# Patient Record
Sex: FEMALE | Race: WHITE | Employment: FULL TIME | URBAN - NONMETROPOLITAN AREA
[De-identification: names, ages, dates, MRNs, and addresses within clinical notes are randomized per-mention and may not be internally consistent; named-entity substitution may affect disease eponyms.]

---

## 2017-09-23 ENCOUNTER — APPOINTMENT (OUTPATIENT)
Dept: GENERAL RADIOLOGY | Age: 47
End: 2017-09-23
Payer: COMMERCIAL

## 2017-09-23 ENCOUNTER — HOSPITAL ENCOUNTER (EMERGENCY)
Age: 47
Discharge: HOME | End: 2017-09-23
Attending: EMERGENCY MEDICINE
Payer: COMMERCIAL

## 2017-09-23 VITALS
DIASTOLIC BLOOD PRESSURE: 90 MMHG | HEIGHT: 65 IN | BODY MASS INDEX: 44.98 KG/M2 | WEIGHT: 270 LBS | TEMPERATURE: 99 F | HEART RATE: 82 BPM | OXYGEN SATURATION: 98 % | SYSTOLIC BLOOD PRESSURE: 127 MMHG | RESPIRATION RATE: 20 BRPM

## 2017-09-23 DIAGNOSIS — R07.9 CHEST PAIN, UNSPECIFIED TYPE: Primary | ICD-10-CM

## 2017-09-23 LAB
ANION GAP SERPL CALCULATED.3IONS-SCNC: 12 MEQ/L (ref 8–16)
ANISOCYTOSIS: ABNORMAL
BASOPHILS # BLD: 0.6 %
BASOPHILS ABSOLUTE: 0.1 THOU/MM3 (ref 0–0.1)
BUN BLDV-MCNC: 13 MG/DL (ref 7–22)
CALCIUM SERPL-MCNC: 9 MG/DL (ref 8.5–10.5)
CHLORIDE BLD-SCNC: 102 MEQ/L (ref 98–111)
CO2: 26 MEQ/L (ref 23–33)
CREAT SERPL-MCNC: 0.7 MG/DL (ref 0.4–1.2)
EOSINOPHIL # BLD: 2.5 %
EOSINOPHILS ABSOLUTE: 0.2 THOU/MM3 (ref 0–0.4)
GFR SERPL CREATININE-BSD FRML MDRD: > 90 ML/MIN/1.73M2
GLUCOSE BLD-MCNC: 87 MG/DL (ref 70–108)
HCT VFR BLD CALC: 36.3 % (ref 37–47)
HEMOGLOBIN: 12.4 GM/DL (ref 12–16)
LYMPHOCYTES # BLD: 29.8 %
LYMPHOCYTES ABSOLUTE: 2.5 THOU/MM3 (ref 1–4.8)
MCH RBC QN AUTO: 28.7 PG (ref 27–31)
MCHC RBC AUTO-ENTMCNC: 34.2 GM/DL (ref 33–37)
MCV RBC AUTO: 84 FL (ref 81–99)
MONOCYTES # BLD: 8.9 %
MONOCYTES ABSOLUTE: 0.8 THOU/MM3 (ref 0.4–1.3)
NUCLEATED RED BLOOD CELLS: 0 /100 WBC
OSMOLALITY CALCULATION: 278.9 MOSMOL/KG (ref 275–300)
PDW BLD-RTO: 14.7 % (ref 11.5–14.5)
PLATELET # BLD: 320 THOU/MM3 (ref 130–400)
PMV BLD AUTO: 8 MCM (ref 7.4–10.4)
POTASSIUM SERPL-SCNC: 3.8 MEQ/L (ref 3.5–5.2)
PREGNANCY, SERUM: NEGATIVE
RBC # BLD: 4.32 MILL/MM3 (ref 4.2–5.4)
RBC # BLD: NORMAL 10*6/UL
SEG NEUTROPHILS: 58.2 %
SEGMENTED NEUTROPHILS ABSOLUTE COUNT: 4.9 THOU/MM3 (ref 1.8–7.7)
SODIUM BLD-SCNC: 140 MEQ/L (ref 135–145)
TROPONIN T: < 0.01 NG/ML
TROPONIN T: < 0.01 NG/ML
WBC # BLD: 8.5 THOU/MM3 (ref 4.8–10.8)

## 2017-09-23 PROCEDURE — 84703 CHORIONIC GONADOTROPIN ASSAY: CPT

## 2017-09-23 PROCEDURE — 80048 BASIC METABOLIC PNL TOTAL CA: CPT

## 2017-09-23 PROCEDURE — 6360000002 HC RX W HCPCS: Performed by: EMERGENCY MEDICINE

## 2017-09-23 PROCEDURE — 85025 COMPLETE CBC W/AUTO DIFF WBC: CPT

## 2017-09-23 PROCEDURE — 84484 ASSAY OF TROPONIN QUANT: CPT

## 2017-09-23 PROCEDURE — 99285 EMERGENCY DEPT VISIT HI MDM: CPT

## 2017-09-23 PROCEDURE — 71020 XR CHEST STANDARD TWO VW: CPT

## 2017-09-23 PROCEDURE — 96374 THER/PROPH/DIAG INJ IV PUSH: CPT

## 2017-09-23 PROCEDURE — 93005 ELECTROCARDIOGRAM TRACING: CPT | Performed by: EMERGENCY MEDICINE

## 2017-09-23 PROCEDURE — 36415 COLL VENOUS BLD VENIPUNCTURE: CPT

## 2017-09-23 PROCEDURE — 6370000000 HC RX 637 (ALT 250 FOR IP): Performed by: EMERGENCY MEDICINE

## 2017-09-23 RX ORDER — ASPIRIN 81 MG/1
324 TABLET, CHEWABLE ORAL ONCE
Status: COMPLETED | OUTPATIENT
Start: 2017-09-23 | End: 2017-09-23

## 2017-09-23 RX ORDER — IBUPROFEN 200 MG
200 TABLET ORAL EVERY 6 HOURS PRN
COMMUNITY
End: 2019-02-10

## 2017-09-23 RX ORDER — CYCLOBENZAPRINE HCL 10 MG
10 TABLET ORAL ONCE
Status: COMPLETED | OUTPATIENT
Start: 2017-09-23 | End: 2017-09-23

## 2017-09-23 RX ORDER — ONDANSETRON 2 MG/ML
4 INJECTION INTRAMUSCULAR; INTRAVENOUS ONCE
Status: COMPLETED | OUTPATIENT
Start: 2017-09-23 | End: 2017-09-23

## 2017-09-23 RX ADMIN — ASPIRIN 81 MG 324 MG: 81 TABLET ORAL at 15:06

## 2017-09-23 RX ADMIN — CYCLOBENZAPRINE HYDROCHLORIDE 10 MG: 10 TABLET, FILM COATED ORAL at 15:58

## 2017-09-23 RX ADMIN — ONDANSETRON 4 MG: 2 INJECTION INTRAMUSCULAR; INTRAVENOUS at 15:06

## 2017-09-23 ASSESSMENT — ENCOUNTER SYMPTOMS
DIARRHEA: 0
VOMITING: 0
NAUSEA: 1
WHEEZING: 0
COUGH: 0
RHINORRHEA: 0
EYE DISCHARGE: 0
EYE PAIN: 0
BACK PAIN: 0
ABDOMINAL PAIN: 0
SHORTNESS OF BREATH: 0
SORE THROAT: 0

## 2017-09-23 ASSESSMENT — HEART SCORE: ECG: 0

## 2017-09-24 LAB
EKG ATRIAL RATE: 81 BPM
EKG P AXIS: 39 DEGREES
EKG P-R INTERVAL: 196 MS
EKG Q-T INTERVAL: 364 MS
EKG QRS DURATION: 96 MS
EKG QTC CALCULATION (BAZETT): 422 MS
EKG R AXIS: 41 DEGREES
EKG T AXIS: 43 DEGREES
EKG VENTRICULAR RATE: 81 BPM

## 2018-02-16 ENCOUNTER — HOSPITAL ENCOUNTER (EMERGENCY)
Age: 48
Discharge: HOME | End: 2018-02-16
Attending: EMERGENCY MEDICINE
Payer: COMMERCIAL

## 2018-02-16 VITALS
DIASTOLIC BLOOD PRESSURE: 88 MMHG | HEART RATE: 85 BPM | WEIGHT: 270.8 LBS | TEMPERATURE: 98.4 F | SYSTOLIC BLOOD PRESSURE: 140 MMHG | OXYGEN SATURATION: 96 % | RESPIRATION RATE: 18 BRPM | BODY MASS INDEX: 45.06 KG/M2

## 2018-02-16 DIAGNOSIS — B02.9 HERPES ZOSTER WITHOUT COMPLICATION: Primary | ICD-10-CM

## 2018-02-16 PROCEDURE — 99213 OFFICE O/P EST LOW 20 MIN: CPT

## 2018-02-16 PROCEDURE — 99213 OFFICE O/P EST LOW 20 MIN: CPT | Performed by: EMERGENCY MEDICINE

## 2018-02-16 RX ORDER — FAMCICLOVIR 500 MG/1
500 TABLET, FILM COATED ORAL 3 TIMES DAILY
Qty: 30 TABLET | Refills: 0 | Status: SHIPPED | OUTPATIENT
Start: 2018-02-16 | End: 2018-02-26

## 2018-02-16 RX ORDER — PREDNISONE 20 MG/1
20 TABLET ORAL DAILY
Qty: 14 TABLET | Refills: 0 | Status: SHIPPED | OUTPATIENT
Start: 2018-02-16 | End: 2018-02-28

## 2018-02-16 ASSESSMENT — ENCOUNTER SYMPTOMS
SHORTNESS OF BREATH: 0
ABDOMINAL PAIN: 0
CONSTIPATION: 0
STRIDOR: 0
SINUS PRESSURE: 0
NAUSEA: 0
EYE PAIN: 0
WHEEZING: 0
VOICE CHANGE: 0
BACK PAIN: 0
FACIAL SWELLING: 0
VOMITING: 0
EYE REDNESS: 0
CHOKING: 0
TROUBLE SWALLOWING: 0
COUGH: 0
EYE DISCHARGE: 0
SORE THROAT: 0
DIARRHEA: 0
BLOOD IN STOOL: 0

## 2018-02-16 ASSESSMENT — PAIN DESCRIPTION - PAIN TYPE: TYPE: ACUTE PAIN

## 2018-02-16 ASSESSMENT — PAIN DESCRIPTION - DESCRIPTORS: DESCRIPTORS: BURNING

## 2018-02-16 ASSESSMENT — PAIN SCALES - GENERAL: PAINLEVEL_OUTOF10: 2

## 2018-02-16 ASSESSMENT — PAIN DESCRIPTION - ORIENTATION: ORIENTATION: RIGHT

## 2018-02-16 ASSESSMENT — PAIN DESCRIPTION - LOCATION: LOCATION: BACK

## 2018-02-16 ASSESSMENT — PAIN DESCRIPTION - FREQUENCY: FREQUENCY: CONTINUOUS

## 2018-02-16 NOTE — ED PROVIDER NOTES
[facility] [address] [facility]  [facility]      CHIEF COMPLAINT     No chief complaint on file. Nurses Notes reviewed and I agree except as noted in the HPI. HISTORY OF PRESENT ILLNESS   Lizy Salgado is a 56 y.o. female who presents With 48 hour history of painful blisters right lateral chest wall, with patient concerned she may have shingles. She rates pain at 2 out of 10 severity. No itch, fever, vomiting, purulent discharge, headache, shortness of breath, cough, abdominal pain,  symptoms. History of varicella, no shingles vaccination. No history of diabetes or immunodeficiency. Nonsmoker    REVIEW OF SYSTEMS     Review of Systems   Constitutional: Negative for appetite change, chills, fatigue, fever and unexpected weight change. HENT: Negative for congestion, ear discharge, ear pain, facial swelling, hearing loss, nosebleeds, postnasal drip, sinus pressure, sore throat, trouble swallowing and voice change. Eyes: Negative for pain, discharge, redness and visual disturbance. Respiratory: Negative for cough, choking, shortness of breath, wheezing and stridor. Cardiovascular: Negative for chest pain and leg swelling. Gastrointestinal: Negative for abdominal pain, blood in stool, constipation, diarrhea, nausea and vomiting. Genitourinary: Negative for dysuria, flank pain, frequency, hematuria, urgency, vaginal bleeding and vaginal discharge. Musculoskeletal: Negative for arthralgias, back pain, neck pain and neck stiffness. Skin: Positive for rash. Painful rash right lateral chest wall   Neurological: Negative for dizziness, seizures, syncope, weakness, light-headedness and headaches. Hematological: Negative for adenopathy. Does not bruise/bleed easily. Psychiatric/Behavioral: Negative for confusion, sleep disturbance and suicidal ideas. The patient is not nervous/anxious. All other systems reviewed and are negative.       PAST MEDICAL HISTORY         Diagnosis complication        DISPOSITION/PLAN   DISPOSITION  Nontoxic, well-hydrated, normal airway. No airway abscess or epiglottitis, sepsis, CNS infection, pneumonia, hypoxia, bronchospasm. Patient has eruption on right lateral chest wall most consistent with uncomplicated shingles. No bacterial infection. No systemic infection. Will treat with prednisone, Famvir, Tylenol, Motrin, increased oral clear liquids, rest.  Patient to recheck with employee health regarding return to work. She is to recheck with PCP in 5 days, and  she understands to go to ED if worse.   PATIENT REFERRED TO:  Carrington Billy CNP  [address]  [phone]    Schedule an appointment as soon as possible for a visit in 5 days  recheck in office, go to emergency if worse    DISCHARGE MEDICATIONS:  Discharge Medication List as of 2/16/2018  9:25 AM      START taking these medications    Details   famciclovir (FAMVIR) 500 MG tablet Take 1 tablet by mouth 3 times daily for 10 days, Disp-30 tablet, R-0Print      predniSONE (DELTASONE) 20 MG tablet Take 1 tablet by mouth daily for 12 days 2 by mouth daily for 4 days, one by mouth daily for 4 days, one half by mouth daily for 4 days, Disp-14 tablet, R-0Print           Discharge Medication List as of 2/16/2018  9:25 AM          MD Bud Viera MD  02/16/18 [id number]

## 2018-05-25 ENCOUNTER — HOSPITAL ENCOUNTER (EMERGENCY)
Age: 48
Discharge: HOME | End: 2018-05-25
Payer: COMMERCIAL

## 2018-05-25 VITALS
RESPIRATION RATE: 18 BRPM | BODY MASS INDEX: 44.93 KG/M2 | OXYGEN SATURATION: 99 % | DIASTOLIC BLOOD PRESSURE: 78 MMHG | HEART RATE: 77 BPM | TEMPERATURE: 98.1 F | SYSTOLIC BLOOD PRESSURE: 148 MMHG | WEIGHT: 270 LBS

## 2018-05-25 DIAGNOSIS — L23.7 POISON IVY DERMATITIS: Primary | ICD-10-CM

## 2018-05-25 PROCEDURE — 99212 OFFICE O/P EST SF 10 MIN: CPT | Performed by: NURSE PRACTITIONER

## 2018-05-25 PROCEDURE — 6360000002 HC RX W HCPCS: Performed by: NURSE PRACTITIONER

## 2018-05-25 PROCEDURE — 96372 THER/PROPH/DIAG INJ SC/IM: CPT

## 2018-05-25 PROCEDURE — 99212 OFFICE O/P EST SF 10 MIN: CPT

## 2018-05-25 RX ORDER — PREDNISONE 20 MG/1
40 TABLET ORAL DAILY
Qty: 6 TABLET | Refills: 0 | Status: SHIPPED | OUTPATIENT
Start: 2018-05-25 | End: 2018-05-28

## 2018-05-25 RX ORDER — METHYLPREDNISOLONE SODIUM SUCCINATE 125 MG/2ML
80 INJECTION, POWDER, LYOPHILIZED, FOR SOLUTION INTRAMUSCULAR; INTRAVENOUS ONCE
Status: COMPLETED | OUTPATIENT
Start: 2018-05-25 | End: 2018-05-25

## 2018-05-25 RX ADMIN — METHYLPREDNISOLONE SODIUM SUCCINATE 80 MG: 125 INJECTION, POWDER, FOR SOLUTION INTRAMUSCULAR; INTRAVENOUS at 12:58

## 2018-05-25 ASSESSMENT — ENCOUNTER SYMPTOMS
SINUS PRESSURE: 0
COUGH: 0
SHORTNESS OF BREATH: 0
CHEST TIGHTNESS: 0

## 2018-05-25 ASSESSMENT — PAIN DESCRIPTION - DESCRIPTORS: DESCRIPTORS: BURNING;DISCOMFORT

## 2018-05-25 ASSESSMENT — PAIN SCALES - GENERAL: PAINLEVEL_OUTOF10: 3

## 2019-02-10 ENCOUNTER — HOSPITAL ENCOUNTER (EMERGENCY)
Age: 49
Discharge: HOME | End: 2019-02-10
Attending: FAMILY MEDICINE
Payer: COMMERCIAL

## 2019-02-10 VITALS
BODY MASS INDEX: 44.98 KG/M2 | DIASTOLIC BLOOD PRESSURE: 89 MMHG | HEIGHT: 65 IN | WEIGHT: 270 LBS | TEMPERATURE: 98.5 F | HEART RATE: 104 BPM | RESPIRATION RATE: 19 BRPM | SYSTOLIC BLOOD PRESSURE: 177 MMHG | OXYGEN SATURATION: 96 %

## 2019-02-10 DIAGNOSIS — J02.9 PHARYNGITIS, UNSPECIFIED ETIOLOGY: ICD-10-CM

## 2019-02-10 DIAGNOSIS — J02.9 SORE THROAT: Primary | ICD-10-CM

## 2019-02-10 LAB
ANION GAP SERPL CALCULATED.3IONS-SCNC: 14 MEQ/L (ref 8–16)
BASOPHILS # BLD: 0.6 %
BASOPHILS ABSOLUTE: 0.1 THOU/MM3 (ref 0–0.1)
BUN BLDV-MCNC: 12 MG/DL (ref 7–22)
CALCIUM SERPL-MCNC: 8.9 MG/DL (ref 8.5–10.5)
CHLORIDE BLD-SCNC: 104 MEQ/L (ref 98–111)
CO2: 24 MEQ/L (ref 23–33)
CREAT SERPL-MCNC: 0.7 MG/DL (ref 0.4–1.2)
EOSINOPHIL # BLD: 0.4 %
EOSINOPHILS ABSOLUTE: 0 THOU/MM3 (ref 0–0.4)
ERYTHROCYTE [DISTWIDTH] IN BLOOD BY AUTOMATED COUNT: 14 % (ref 11.5–14.5)
ERYTHROCYTE [DISTWIDTH] IN BLOOD BY AUTOMATED COUNT: 43.5 FL (ref 35–45)
FLU A ANTIGEN: NEGATIVE
FLU B ANTIGEN: NEGATIVE
GFR SERPL CREATININE-BSD FRML MDRD: 90 ML/MIN/1.73M2
GLUCOSE BLD-MCNC: 122 MG/DL (ref 70–108)
GROUP A STREP CULTURE, REFLEX: NEGATIVE
HCT VFR BLD CALC: 37.9 % (ref 37–47)
HEMOGLOBIN: 12.5 GM/DL (ref 12–16)
HETEROPHILE ANTIBODIES: NEGATIVE
IMMATURE GRANS (ABS): 0.03 THOU/MM3 (ref 0–0.07)
IMMATURE GRANULOCYTES: 0.4 %
LYMPHOCYTES # BLD: 13.5 %
LYMPHOCYTES ABSOLUTE: 1.1 THOU/MM3 (ref 1–4.8)
MCH RBC QN AUTO: 28 PG (ref 26–33)
MCHC RBC AUTO-ENTMCNC: 33 GM/DL (ref 32.2–35.5)
MCV RBC AUTO: 85 FL (ref 81–99)
MONOCYTES # BLD: 13.3 %
MONOCYTES ABSOLUTE: 1.1 THOU/MM3 (ref 0.4–1.3)
NUCLEATED RED BLOOD CELLS: 0 /100 WBC
OSMOLALITY CALCULATION: 284.2 MOSMOL/KG (ref 275–300)
PLATELET # BLD: 299 THOU/MM3 (ref 130–400)
PMV BLD AUTO: 9.6 FL (ref 9.4–12.4)
POTASSIUM SERPL-SCNC: 4.4 MEQ/L (ref 3.5–5.2)
RBC # BLD: 4.46 MILL/MM3 (ref 4.2–5.4)
REFLEX THROAT C + S: NORMAL
SEG NEUTROPHILS: 71.8 %
SEGMENTED NEUTROPHILS ABSOLUTE COUNT: 6.1 THOU/MM3 (ref 1.8–7.7)
SODIUM BLD-SCNC: 142 MEQ/L (ref 135–145)
WBC # BLD: 8.5 THOU/MM3 (ref 4.8–10.8)

## 2019-02-10 PROCEDURE — 86308 HETEROPHILE ANTIBODY SCREEN: CPT

## 2019-02-10 PROCEDURE — 85025 COMPLETE CBC W/AUTO DIFF WBC: CPT

## 2019-02-10 PROCEDURE — 99283 EMERGENCY DEPT VISIT LOW MDM: CPT

## 2019-02-10 PROCEDURE — 36415 COLL VENOUS BLD VENIPUNCTURE: CPT

## 2019-02-10 PROCEDURE — 87070 CULTURE OTHR SPECIMN AEROBIC: CPT

## 2019-02-10 PROCEDURE — 80048 BASIC METABOLIC PNL TOTAL CA: CPT

## 2019-02-10 PROCEDURE — 6370000000 HC RX 637 (ALT 250 FOR IP): Performed by: FAMILY MEDICINE

## 2019-02-10 PROCEDURE — 87804 INFLUENZA ASSAY W/OPTIC: CPT

## 2019-02-10 PROCEDURE — 87880 STREP A ASSAY W/OPTIC: CPT

## 2019-02-10 RX ORDER — IBUPROFEN 200 MG
400 TABLET ORAL ONCE
Status: COMPLETED | OUTPATIENT
Start: 2019-02-10 | End: 2019-02-10

## 2019-02-10 RX ADMIN — IBUPROFEN 400 MG: 200 TABLET, FILM COATED ORAL at 06:08

## 2019-02-10 RX ADMIN — LIDOCAINE HYDROCHLORIDE 15 ML: 20 SOLUTION ORAL; TOPICAL at 06:08

## 2019-02-10 ASSESSMENT — PAIN DESCRIPTION - DESCRIPTORS: DESCRIPTORS: BURNING

## 2019-02-10 ASSESSMENT — PAIN DESCRIPTION - LOCATION: LOCATION: THROAT;EAR;HEAD

## 2019-02-10 ASSESSMENT — PAIN DESCRIPTION - FREQUENCY: FREQUENCY: CONTINUOUS

## 2019-02-10 ASSESSMENT — PAIN DESCRIPTION - PAIN TYPE: TYPE: ACUTE PAIN

## 2019-02-10 ASSESSMENT — PAIN SCALES - GENERAL: PAINLEVEL_OUTOF10: 3

## 2019-02-12 ENCOUNTER — OFFICE VISIT (OUTPATIENT)
Dept: INTERNAL MEDICINE CLINIC | Age: 49
End: 2019-02-12
Payer: COMMERCIAL

## 2019-02-12 VITALS
WEIGHT: 263.5 LBS | SYSTOLIC BLOOD PRESSURE: 148 MMHG | DIASTOLIC BLOOD PRESSURE: 72 MMHG | HEART RATE: 95 BPM | BODY MASS INDEX: 43.9 KG/M2 | TEMPERATURE: 99.1 F | HEIGHT: 65 IN | RESPIRATION RATE: 16 BRPM

## 2019-02-12 DIAGNOSIS — R73.09 ELEVATED GLUCOSE LEVEL: ICD-10-CM

## 2019-02-12 DIAGNOSIS — Z13.220 SCREENING CHOLESTEROL LEVEL: ICD-10-CM

## 2019-02-12 DIAGNOSIS — J01.10 ACUTE NON-RECURRENT FRONTAL SINUSITIS: ICD-10-CM

## 2019-02-12 DIAGNOSIS — H65.00 ACUTE SEROUS OTITIS MEDIA, RECURRENCE NOT SPECIFIED, UNSPECIFIED LATERALITY: ICD-10-CM

## 2019-02-12 DIAGNOSIS — B96.89 ACUTE BACTERIAL TONSILLITIS: Primary | ICD-10-CM

## 2019-02-12 DIAGNOSIS — J03.80 ACUTE BACTERIAL TONSILLITIS: Primary | ICD-10-CM

## 2019-02-12 PROCEDURE — 99204 OFFICE O/P NEW MOD 45 MIN: CPT | Performed by: NURSE PRACTITIONER

## 2019-02-12 RX ORDER — AMOXICILLIN AND CLAVULANATE POTASSIUM 875; 125 MG/1; MG/1
1 TABLET, FILM COATED ORAL 2 TIMES DAILY
Qty: 20 TABLET | Refills: 0 | Status: SHIPPED | OUTPATIENT
Start: 2019-02-12 | End: 2019-02-22

## 2019-02-12 RX ORDER — FLUTICASONE PROPIONATE 50 MCG
2 SPRAY, SUSPENSION (ML) NASAL DAILY
Qty: 1 BOTTLE | Refills: 0 | Status: SHIPPED | OUTPATIENT
Start: 2019-02-12

## 2019-02-12 ASSESSMENT — ENCOUNTER SYMPTOMS
COUGH: 0
DIARRHEA: 0
BACK PAIN: 0
STRIDOR: 0
ABDOMINAL PAIN: 0
WHEEZING: 0
TROUBLE SWALLOWING: 1
ABDOMINAL DISTENTION: 0
NAUSEA: 0
SHORTNESS OF BREATH: 0
RHINORRHEA: 1
VOMITING: 0
CHEST TIGHTNESS: 0
PHOTOPHOBIA: 0
SORE THROAT: 1
CONSTIPATION: 0

## 2019-02-12 ASSESSMENT — PATIENT HEALTH QUESTIONNAIRE - PHQ9
2. FEELING DOWN, DEPRESSED OR HOPELESS: 1
SUM OF ALL RESPONSES TO PHQ9 QUESTIONS 1 & 2: 1
SUM OF ALL RESPONSES TO PHQ QUESTIONS 1-9: 1
1. LITTLE INTEREST OR PLEASURE IN DOING THINGS: 0
SUM OF ALL RESPONSES TO PHQ QUESTIONS 1-9: 1

## 2019-02-13 LAB — THROAT/NOSE CULTURE: NORMAL

## 2019-03-08 ENCOUNTER — EMPLOYEE WELLNESS (OUTPATIENT)
Dept: OTHER | Age: 49
End: 2019-03-08

## 2019-03-08 LAB
CHOLESTEROL, TOTAL: 117 MG/DL (ref 0–199)
FASTING: YES
GLUCOSE BLD-MCNC: 100 MG/DL (ref 74–109)
HDLC SERPL-MCNC: 70 MG/DL (ref 40–90)
LDL CHOLESTEROL CALCULATED: 40 MG/DL
TRIGL SERPL-MCNC: 37 MG/DL (ref 0–199)

## 2019-03-12 ENCOUNTER — OFFICE VISIT (OUTPATIENT)
Dept: INTERNAL MEDICINE CLINIC | Age: 49
End: 2019-03-12
Payer: COMMERCIAL

## 2019-03-12 VITALS
SYSTOLIC BLOOD PRESSURE: 116 MMHG | BODY MASS INDEX: 44.4 KG/M2 | WEIGHT: 266.5 LBS | HEIGHT: 65 IN | RESPIRATION RATE: 14 BRPM | DIASTOLIC BLOOD PRESSURE: 62 MMHG | HEART RATE: 73 BPM

## 2019-03-12 DIAGNOSIS — R07.9 CHEST PAIN, UNSPECIFIED TYPE: ICD-10-CM

## 2019-03-12 DIAGNOSIS — R73.09 ELEVATED GLUCOSE LEVEL: ICD-10-CM

## 2019-03-12 DIAGNOSIS — R53.83 FATIGUE, UNSPECIFIED TYPE: Primary | ICD-10-CM

## 2019-03-12 DIAGNOSIS — K21.9 GASTROESOPHAGEAL REFLUX DISEASE, ESOPHAGITIS PRESENCE NOT SPECIFIED: ICD-10-CM

## 2019-03-12 DIAGNOSIS — F41.9 ANXIETY: ICD-10-CM

## 2019-03-12 LAB — HBA1C MFR BLD: 5.4 % (ref 4.3–5.7)

## 2019-03-12 PROCEDURE — 83036 HEMOGLOBIN GLYCOSYLATED A1C: CPT | Performed by: NURSE PRACTITIONER

## 2019-03-12 PROCEDURE — 93000 ELECTROCARDIOGRAM COMPLETE: CPT | Performed by: NURSE PRACTITIONER

## 2019-03-12 PROCEDURE — 99214 OFFICE O/P EST MOD 30 MIN: CPT | Performed by: NURSE PRACTITIONER

## 2019-03-12 RX ORDER — IBUPROFEN 200 MG
200 TABLET ORAL EVERY 6 HOURS PRN
COMMUNITY

## 2019-03-12 RX ORDER — OMEPRAZOLE 20 MG/1
20 CAPSULE, DELAYED RELEASE ORAL
Qty: 30 CAPSULE | Refills: 0 | Status: SHIPPED | OUTPATIENT
Start: 2019-03-12 | End: 2019-05-13 | Stop reason: ALTCHOICE

## 2019-03-12 RX ORDER — LORATADINE 10 MG/1
10 CAPSULE, LIQUID FILLED ORAL DAILY PRN
COMMUNITY

## 2019-03-12 NOTE — PROGRESS NOTES
[facility] PROFESSIONAL SERVS  PHYSICIANS [facility] [address]  [address] [facility], [address]  Dept: [phone]  Dept Fax: [phone]      Evy Hood  53 y.o.  [id number]  4/7/19    Chief Complaint   Patient presents with    Other     elevated glucose    Results     labs completed 3/8/19         Current concerns - Evy Hood presents with fatigue and lab review. A1C today 5.4, checked due to elevated glucose level 2/10/19 of 122. Last lipids from 3/8/19 show total chol 117, trig 37 (goal <150), HDL 70 (>40 men, >50 women), LDL 40 (goal <70). Reviewed with pt and discussed. Fatigue - Continues, pt states she sleeps at 5 straight hours, goes to bed early at 6:30-7 pm, states she does not sleep well after 5 hours. Cannot stay asleep. No racing thoughts. States she feels her mood is improving overall. Periods are heavy and painful, sees GYN, had a biopsy and they suggested BCP. No hirsutism. States some fullness in her throat, dry itchy eyes. Denies brittle hair/nails. Chest pain - Has chest heaviness, panicky feeling, improving, last episode 12 weeks ago. Had full cardiac workup in the past for this. Has burning in her esophagus at times sometimes nausea, related to eating or smell of food. Takes griselda tea. No PPI/HR2A    STOP-Bang questionnaire    1. Do you Snore loudly (loud enough to be heard through closed doors or your bed partner elbows you for snoring at night)? Yes    2. Do you often feel Tired, Fatigued, or Sleepy during the daytime (such as falling asleep during driving)? Yes    3. Has anyone Observed you Stop Breathing or Choking/Gasping during your sleep? No    4. Do you have or are being treated for High Blood Pressure? No    5. Body Mass Index more than 35kg/m2? Yes    6. Age older than 48years old? No    7. Neck size large? (measured around Osborne apple) Yes   For males, is your shirt collar 17 inches or larger?    For females, is your shirt collar 16 inches or larger? 8. Gender= Male?  No      Total Score: 4    Scoring Criteria:    For general population:   Low Risk of JOAQUIN: Yes to 0 to 2 questions   Intermediate Risk of JOAQUIN: Yes to 3 to 4 questions   High Risk of JOAQUIN: Yes to 5 to 8 questions      Past Medical History:   Diagnosis Date    Gall stones         Past Surgical History:   Procedure Laterality Date    NASAL FRACTURE SURGERY      TUBAL LIGATION          Family History   Problem Relation Age of Onset    Heart Disease Mother     Diabetes Maternal Grandfather     Heart Disease Paternal Grandfather     Dementia Paternal Grandfather     Diabetes Paternal Grandfather     Diabetes Maternal Grandmother         Social History     Socioeconomic History    Marital status:      Spouse name: Not on file    Number of children: Not on file    Years of education: Not on file    Highest education level: Not on file   Occupational History    Not on file   Social Needs    Financial resource strain: Not on file    Food insecurity:     Worry: Not on file     Inability: Not on file    Transportation needs:     Medical: Not on file     Non-medical: Not on file   Tobacco Use    Smoking status: Never Smoker    Smokeless tobacco: Never Used   Substance and Sexual Activity    Alcohol use: No    Drug use: No    Sexual activity: Yes     Partners: Male   Lifestyle    Physical activity:     Days per week: Not on file     Minutes per session: Not on file    Stress: Not on file   Relationships    Social connections:     Talks on phone: Not on file     Gets together: Not on file     Attends [employer] service: Not on file     Active member of club or organization: Not on file     Attends meetings of clubs or organizations: Not on file     Relationship status: Not on file    Intimate partner violence:     Fear of current or ex partner: Not on file     Emotionally abused: Not on file     Physically abused: Not on file     Forced sexual activity: Not on file   Other Topics Concern    Not on file   Social History Narrative    Not on file       Prior to Admission medications    Medication Sig Start Date End Date Taking? Authorizing Provider   loratadine (CLARITIN) 10 MG capsule Take 10 mg by mouth daily as needed   Yes Historical Provider, MD   ibuprofen (ADVIL;MOTRIN) 200 MG tablet Take 200 mg by mouth every 6 hours as needed for Pain   Yes Historical Provider, MD   omeprazole (PRILOSEC) 20 MG delayed release capsule Take 1 capsule by mouth every morning (before breakfast) 3/12/19  Yes EMI Courtney CNP   fluticasone (FLONASE) 50 MCG/ACT nasal spray 2 sprays by Each Nare route daily 2/12/19  Yes EMI Courtney CNP   sertraline (ZOLOFT) 50 MG tablet Take 1 tablet by mouth daily 3/28/19   EMI Courtney CNP        No Known Allergies    Review of Systems - General ROS: negative for - chills, fever or malaise. Positive for fatigue. Started multi vitamin. Psychological ROS: positive for - depression, states improving. Hematological and Lymphatic ROS: No history of blood clots or bleeding disorder. Respiratory ROS: no cough, shortness of breath, or wheezing  Cardiovascular ROS: no chest pain or dyspnea on exertion  Gastrointestinal ROS: no abdominal pain, change in bowel habits, or black or bloody stools  Genito-Urinary ROS: No dysuria, hematuria, burning or pain with urination. Musculoskeletal ROS: No joint pain, joint swelling, muscle aches or muscle weakness. Neurological ROS: No dizziness, headaches, unsteady gait, balance instability. Dermatological ROS: No rashes or wounds. Blood pressure 116/62, pulse 73, resp. rate 14, height 5' 5\" (1.651 m), weight 266 lb 8 oz (120.9 kg). Physical Examination:   Constitutional - Alert, cooperative, well groomed, and in no distress. Vital signs stable   Vitals:    03/12/19 1042   BP: 116/62   Pulse: 73   Resp:      Mental status - Alert and oriented to person, place, and time.  Good insight, appropriate responses, mood appropriate. Head - Atraumatic, normocephalic. Eyes - Pupils equal and reactive to light, extraocular movements intact  Ears - External ears are normal, hearing is grossly normal to speech  Mouth - Oropharynx clear, mucous membranes pink and moist, dentition intact. Neck - supple, no significant adenopathy, no JVD. Chest - No distress. No increased work of breathing. Clear to auscultation in all fields, no wheezes, rales or rhonchi, symmetric air entry. Heart - normal rate, regular rhythm, normal S1, S2, no murmurs, rubs  or gallops  Abdomen -soft, nontender, nondistended  Neurological - alert, oriented, grossly intact  Extremities - peripheral pulses normal, no pedal edema, no clubbing or cyanosis  Skin - warm and dry, no rashes, lesions, or wounds evident on exposed skin    Diagnostic Data:  I have reviewed recent diagnostic testing including labs, EKG, radiology results. Please see EKG for interpretation, sinus rhythm, no ST/T wave changes    Lab Results   Component Value Date     02/10/2019    K 4.4 02/10/2019     02/10/2019    CO2 24 02/10/2019    BUN 12 02/10/2019    CREATININE 0.7 02/10/2019    GLUCOSE 100 03/08/2019    CALCIUM 8.9 02/10/2019      Lab Results   Component Value Date    LABA1C 5.4 03/12/2019     No results found for: EAG  Lab Results   Component Value Date    CHOL 117 03/08/2019    CHOL 105 03/06/2015     Lab Results   Component Value Date    TRIG 37 03/08/2019    TRIG 27 03/06/2015     Lab Results   Component Value Date    HDL 70 03/08/2019    HDL 66 03/06/2015     Lab Results   Component Value Date    LDLCALC 40 03/08/2019    LDLCALC 34 03/06/2015     No results found for: LABVLDL, VLDL  No results found for: CHOLHDLRATIO  Lab Results   Component Value Date    WBC 8.5 02/10/2019    HGB 12.5 02/10/2019    HCT 37.9 02/10/2019    MCV 85.0 02/10/2019     02/10/2019         Assessment/Plan:   Diagnosis Orders   1.  Fatigue,

## 2019-03-12 NOTE — PATIENT INSTRUCTIONS
Would prefer to pursue weight loss and exercise to improve mood, energy and snoring. Encouraged, education given. Start Omeprazole 20 mg daily    Patient Education        Learning About Cutting Calories  How do calories affect your weight? Food gives your body energy. Energy from the food you eat is measured in calories. This energy keeps your heart beating, your brain active, and your muscles working. Your body needs a certain number of calories each day. After your body uses the calories it needs, it stores extra calories as fat. To lose weight safely, you have to eat fewer calories while eating in a healthy way. How many calories do you need each day? The more active you are, the more calories you need. When you are less active, you need fewer calories. How many calories you need each day also depends on several things, including your age and whether you are male or female. Here are some general guidelines for adults:  · Less active women and older adults need 1,600 to 2,000 calories each day. · Active women and less active men need 2,000 to 2,400 calories each day. · Active men need 2,400 to 3,000 calories each day. How can you cut calories and eat healthy meals? Whole grains, vegetables and fruits, and dried beans are good lower-calorie foods. They give you lots of nutrients and fiber. And they fill you up. Sweets, energy drinks, and soda pop are high in calories. They give you few nutrients and no fiber. Try to limit soda pop, fruit juice, and energy drinks. Drink water instead. Some fats can be part of a healthy diet. But cutting back on fats from highly processed foods like fast foods and many snack foods is a good way to lower the calories in your diet. Also, use smaller amounts of fats like butter, margarine, salad dressing, and mayonnaise. Add fresh garlic, lemon, or herbs to your meals to add flavor without adding fat. Meats and dairy products can be a big source of hidden fats.  Try to choose lean or low-fat versions of these products. Fat-free cookies, candies, chips, and frozen treats can still be high in sugar and calories. Some fat-free foods have more calories than regular ones. Eat fat-free treats in moderation, as you would other foods. If your favorite foods are high in fat, salt, sugar, or calories, limit how often you eat them. Eat smaller servings, or look for healthy substitutes. Fill up on fruits, vegetables, and whole grains. Eating at home  · Use meat as a side dish instead of as the main part of your meal.  · Try main dishes that use whole wheat pasta, brown rice, dried beans, or vegetables. · Find ways to cook with little or no fat, such as broiling, steaming, or grilling. · Use cooking spray instead of oil. If you use oil, use a monounsaturated oil, such as canola or olive oil. · Trim fat from meats before you cook them. · Drain off fat after you brown the meat or while you roast it. · Chill soups and stews after you cook them. Then skim the fat off the top after it hardens. Eating out  · Order foods that are broiled or poached rather than fried or breaded. · Cut back on the amount of butter or margarine that you use on bread. · Order sauces, gravies, and salad dressings on the side, and use only a little. · When you order pasta, choose tomato sauce rather than cream sauce. · Ask for salsa with your baked potato instead of sour cream, butter, cheese, or celaya. · Order meals in a small size instead of upgrading to a large. · Share an entree, or take part of your food home to eat as another meal.  · Share appetizers and desserts. Where can you learn more? Go to https://SpotMe FitnessvadimGravy.Mine. org and sign in to your [employer] account. Enter Q495 in the [employer] box to learn more about \"Learning About Cutting Calories. \"     If you do not have an account, please click on the \"Sign Up Now\" link.   Current as of: March 28, 2018  Content Version: 11.9  © 5627-5078 Healthwise, Incorporated. Care instructions adapted under license by [facility] ([facility]). If you have questions about a medical condition or this instruction, always ask your healthcare professional. [address] any warranty or liability for your use of this information. Patient Education        Starting a Weight Loss Plan: Care Instructions  Your Care Instructions    If you are thinking about losing weight, it can be hard to know where to start. Your doctor can help you set up a weight loss plan that best meets your needs. You may want to take a class on nutrition or exercise, or join a weight loss support group. If you have questions about how to make changes to your eating or exercise habits, ask your doctor about seeing a registered dietitian or an exercise specialist.  It can be a big challenge to lose weight. But you do not have to make huge changes at once. Make small changes, and stick with them. When those changes become habit, add a few more changes. If you do not think you are ready to make changes right now, try to pick a date in the future. Make an appointment to see your doctor to discuss whether the time is right for you to start a plan. Follow-up care is a key part of your treatment and safety. Be sure to make and go to all appointments, and call your doctor if you are having problems. It's also a good idea to know your test results and keep a list of the medicines you take. How can you care for yourself at home? · Set realistic goals. Many people expect to lose much more weight than is likely. A weight loss of 5% to 10% of your body weight may be enough to improve your health. · Get family and friends involved to provide support. Talk to them about why you are trying to lose weight, and ask them to help. They can help by participating in exercise and having meals with you, even if they may be eating something different. · Find what works best for you. If you do not have time or do not like to cook, a program that offers meal replacement bars or shakes may be better for you. Or if you like to prepare meals, finding a plan that includes daily menus and recipes may be best.  · Ask your doctor about other health professionals who can help you achieve your weight loss goals. ? A dietitian can help you make healthy changes in your diet. ? An exercise specialist or  can help you develop a safe and effective exercise program.  ? A counselor or psychiatrist can help you cope with issues such as depression, anxiety, or family problems that can make it hard to focus on weight loss. · Consider joining a support group for people who are trying to lose weight. Your doctor can suggest groups in your area. Where can you learn more? Go to https://Viddyadceceeb.wireWAX. org and sign in to your [employer] account. Enter Q478 in the Mu[employer] box to learn more about \"Starting a Weight Loss Plan: Care Instructions. \"     If you do not have an account, please click on the \"Sign Up Now\" link. Current as of: June 25, 2018  Content Version: 11.9  © 1726-0285 [employer]. Care instructions adapted under license by [facility] ([facility]). If you have questions about a medical condition or this instruction, always ask your healthcare professional. [address] any warranty or liability for your use of this information.

## 2019-03-16 ENCOUNTER — HOSPITAL ENCOUNTER (OUTPATIENT)
Age: 49
Discharge: HOME | End: 2019-03-16
Payer: COMMERCIAL

## 2019-03-16 DIAGNOSIS — R53.83 FATIGUE, UNSPECIFIED TYPE: ICD-10-CM

## 2019-03-16 LAB — TSH SERPL DL<=0.05 MIU/L-ACNC: 1.42 UIU/ML (ref 0.4–4.2)

## 2019-03-16 PROCEDURE — 36415 COLL VENOUS BLD VENIPUNCTURE: CPT

## 2019-03-16 PROCEDURE — 84443 ASSAY THYROID STIM HORMONE: CPT

## 2019-03-28 ENCOUNTER — OFFICE VISIT (OUTPATIENT)
Dept: INTERNAL MEDICINE CLINIC | Age: 49
End: 2019-03-28
Payer: COMMERCIAL

## 2019-03-28 VITALS
HEART RATE: 68 BPM | DIASTOLIC BLOOD PRESSURE: 68 MMHG | HEIGHT: 65 IN | WEIGHT: 261 LBS | SYSTOLIC BLOOD PRESSURE: 132 MMHG | BODY MASS INDEX: 43.49 KG/M2

## 2019-03-28 DIAGNOSIS — R53.83 FATIGUE, UNSPECIFIED TYPE: ICD-10-CM

## 2019-03-28 DIAGNOSIS — F32.1 CURRENT MODERATE EPISODE OF MAJOR DEPRESSIVE DISORDER, UNSPECIFIED WHETHER RECURRENT (HCC): Primary | ICD-10-CM

## 2019-03-28 PROCEDURE — G0444 DEPRESSION SCREEN ANNUAL: HCPCS | Performed by: NURSE PRACTITIONER

## 2019-03-28 PROCEDURE — 99214 OFFICE O/P EST MOD 30 MIN: CPT | Performed by: NURSE PRACTITIONER

## 2019-03-28 ASSESSMENT — PATIENT HEALTH QUESTIONNAIRE - PHQ9
4. FEELING TIRED OR HAVING LITTLE ENERGY: 3
7. TROUBLE CONCENTRATING ON THINGS, SUCH AS READING THE NEWSPAPER OR WATCHING TELEVISION: 2
2. FEELING DOWN, DEPRESSED OR HOPELESS: 3
5. POOR APPETITE OR OVEREATING: 2
SUM OF ALL RESPONSES TO PHQ QUESTIONS 1-9: 16
SUM OF ALL RESPONSES TO PHQ QUESTIONS 1-9: 16
8. MOVING OR SPEAKING SO SLOWLY THAT OTHER PEOPLE COULD HAVE NOTICED. OR THE OPPOSITE, BEING SO FIGETY OR RESTLESS THAT YOU HAVE BEEN MOVING AROUND A LOT MORE THAN USUAL: 1
9. THOUGHTS THAT YOU WOULD BE BETTER OFF DEAD, OR OF HURTING YOURSELF: 0
3. TROUBLE FALLING OR STAYING ASLEEP: 3
10. IF YOU CHECKED OFF ANY PROBLEMS, HOW DIFFICULT HAVE THESE PROBLEMS MADE IT FOR YOU TO DO YOUR WORK, TAKE CARE OF THINGS AT HOME, OR GET ALONG WITH OTHER PEOPLE: 2
SUM OF ALL RESPONSES TO PHQ9 QUESTIONS 1 & 2: 5
6. FEELING BAD ABOUT YOURSELF - OR THAT YOU ARE A FAILURE OR HAVE LET YOURSELF OR YOUR FAMILY DOWN: 0
1. LITTLE INTEREST OR PLEASURE IN DOING THINGS: 2

## 2019-03-28 NOTE — PROGRESS NOTES
[facility] PROFESSIONAL SERVS  PHYSICIANS [facility] [address], [address]  Dept: [phone]  Dept Fax: [phone]      Evy Hood  53 y.o.  [id number]  3/28/19    Chief Complaint   Patient presents with    Fatigue    Discuss Labs       Current concerns - Evy Hood presents for fatigue and lab review. She states her  has a drinking problem and a lot of her stress comes from that. He is not nice to her when he drinks. She states she has had a rough week. She wishes to see a counselor. She has been more irritable and noises are bothering her. PHQ 9 16 - moderately severe depression. TSH normal.     She has been working on her diet and exercise. Weight is down 5# since last visit. She does stress eat. She does snore which has worsened as she has gained weight. Encouraged on her weight loss! Mother had a severe psychiatric illness, \"chemical imbalance\", heard voices. As did another niece, aunt, multiple relatives. Pt denies any suicidal or homidical ideation.        Past Medical History:   Diagnosis Date    Gall stones         Past Surgical History:   Procedure Laterality Date    NASAL FRACTURE SURGERY      TUBAL LIGATION          Family History   Problem Relation Age of Onset    Heart Disease Mother     Diabetes Maternal Grandfather     Heart Disease Paternal Grandfather     Dementia Paternal Grandfather     Diabetes Paternal Grandfather     Diabetes Maternal Grandmother         Social History     Socioeconomic History    Marital status:      Spouse name: Not on file    Number of children: Not on file    Years of education: Not on file    Highest education level: Not on file   Occupational History    Not on file   Social Needs    Financial resource strain: Not on file    Food insecurity:     Worry: Not on file     Inability: Not on file    Transportation needs:     Medical: Not on file     Non-medical: Not on file   Tobacco Use    Smoking status: Never Smoker    Smokeless tobacco: Never Used   Substance and Sexual Activity    Alcohol use: No    Drug use: No    Sexual activity: Yes     Partners: Male   Lifestyle    Physical activity:     Days per week: Not on file     Minutes per session: Not on file    Stress: Not on file   Relationships    Social connections:     Talks on phone: Not on file     Gets together: Not on file     Attends Mor[employer] service: Not on file     Active member of club or organization: Not on file     Attends meetings of clubs or organizations: Not on file     Relationship status: Not on file    Intimate partner violence:     Fear of current or ex partner: Not on file     Emotionally abused: Not on file     Physically abused: Not on file     Forced sexual activity: Not on file   Other Topics Concern    Not on file   Social History Narrative    Not on file       Prior to Admission medications    Medication Sig Start Date End Date Taking? Authorizing Provider   loratadine (CLARITIN) 10 MG capsule Take 10 mg by mouth daily as needed   Yes Historical Provider, MD   ibuprofen (ADVIL;MOTRIN) 200 MG tablet Take 200 mg by mouth every 6 hours as needed for Pain   Yes Historical Provider, MD   omeprazole (PRILOSEC) 20 MG delayed release capsule Take 1 capsule by mouth every morning (before breakfast) 3/12/19  Yes EMI Fox CNP   fluticasone (FLONASE) 50 MCG/ACT nasal spray 2 sprays by Each Nare route daily 2/12/19  Yes EMI Fox CNP        No Known Allergies    Review of Systems - General ROS: positive for  - fatigue  negative for - chills, fever or malaise  Psychological ROS: positive for - irritability, anxiety and depression  negative for - physical abuse, sexual abuse or suicidal ideation  Hematological and Lymphatic ROS: No history of blood clots or bleeding disorder.    Respiratory ROS: no cough, shortness of breath, or wheezing  Cardiovascular ROS: no chest pain or dyspnea on exertion  Gastrointestinal ROS: no abdominal pain, change in bowel habits, or black or bloody stools  Genito-Urinary ROS: no dysuria, trouble voiding, or hematuria  Musculoskeletal ROS: negative for - joint pain, joint swelling, muscle pain or weakness  Neurological ROS: negative for - confusion, dizziness, headaches, impaired coordination/balance, numbness/tingling or weakness  Dermatological ROS: negative for - rash or wounds    Blood pressure 132/68, pulse 68, height 5' 5\" (1.651 m), weight 261 lb (118.4 kg). Physical Examination:   Constitutional - Alert, cooperative, well groomed, and in no distress. Vital signs stable   Vitals:    03/28/19 1540   BP: 132/68   Pulse: 68     Mental status - Alert and oriented to person, place, and time. Good insight, appropriate responses, mood appropriate. Head - Atraumatic, normocephalic. Eyes - Pupils equal and reactive to light, extraocular movements intact  Ears - External ears are normal, hearing is grossly normal to speech  Mouth - Oropharynx clear, mucous membranes pink and moist, dentition intact. Neck - supple, no significant adenopathy, no JVD. Chest - No distress. No increased work of breathing. Clear to auscultation in all fields, no wheezes, rales or rhonchi, symmetric air entry.    Heart - normal rate, regular rhythm, normal S1, S2, no murmurs, rubs  or gallops  Abdomen -soft, nontender, nondistended  Neurological - alert, oriented, grossly intact  Extremities - peripheral pulses normal, no pedal edema, no clubbing or cyanosis  Skin - warm and dry, no rashes, lesions, or wounds evident on exposed skin    Diagnostic Data:  I have reviewed recent diagnostic testing including labs    Lab Results   Component Value Date     02/10/2019    K 4.4 02/10/2019     02/10/2019    CO2 24 02/10/2019    BUN 12 02/10/2019    CREATININE 0.7 02/10/2019    GLUCOSE 100 03/08/2019    CALCIUM 8.9 02/10/2019      Lab Results   Component Value Date    LABA1C 5.4 03/12/2019     No results found for: EAG  Lab Results   Component Value Date    CHOL 117 03/08/2019    CHOL 105 03/06/2015     Lab Results   Component Value Date    TRIG 37 03/08/2019    TRIG 27 03/06/2015     Lab Results   Component Value Date    HDL 70 03/08/2019    HDL 66 03/06/2015     Lab Results   Component Value Date    LDLCALC 40 03/08/2019    LDLCALC 34 03/06/2015     No results found for: LABVLDL, VLDL  No results found for: CHOLHDLRATIO  Lab Results   Component Value Date    WBC 8.5 02/10/2019    HGB 12.5 02/10/2019    HCT 37.9 02/10/2019    MCV 85.0 02/10/2019     02/10/2019         Assessment/Plan:   Diagnosis Orders   1. Current moderate episode of major depressive disorder, unspecified whether recurrent ([facility]     2. Fatigue, unspecified type         No orders of the defined types were placed in this encounter. Will start Zoloft 50 mg daily. See counselor for anxiety/depression  More than 30 minutes spent in supportive counseling for this patient including self care, options for treatment, coping techniques     Will schedule follow up appointment in 1 month    Electronically signed by EMI Nevarez CNP 03/28/19 4:14 PM     [address]  [place] [address], [address]     Phone number: [phone]  Fax number: [phone]

## 2019-04-08 VITALS — BODY MASS INDEX: 43.93 KG/M2 | WEIGHT: 264 LBS

## 2019-04-11 ENCOUNTER — NURSE ONLY (OUTPATIENT)
Dept: LAB | Age: 49
End: 2019-04-11

## 2019-04-11 ENCOUNTER — OFFICE VISIT (OUTPATIENT)
Dept: INTERNAL MEDICINE CLINIC | Age: 49
End: 2019-04-11
Payer: COMMERCIAL

## 2019-04-11 VITALS
WEIGHT: 257 LBS | HEIGHT: 65 IN | DIASTOLIC BLOOD PRESSURE: 70 MMHG | BODY MASS INDEX: 42.82 KG/M2 | SYSTOLIC BLOOD PRESSURE: 133 MMHG | HEART RATE: 80 BPM | RESPIRATION RATE: 14 BRPM

## 2019-04-11 DIAGNOSIS — N93.9 EPISODE OF HEAVY VAGINAL BLEEDING: Primary | ICD-10-CM

## 2019-04-11 DIAGNOSIS — F32.1 CURRENT MODERATE EPISODE OF MAJOR DEPRESSIVE DISORDER, UNSPECIFIED WHETHER RECURRENT (HCC): ICD-10-CM

## 2019-04-11 DIAGNOSIS — N93.9 EPISODE OF HEAVY VAGINAL BLEEDING: ICD-10-CM

## 2019-04-11 DIAGNOSIS — K21.9 GASTROESOPHAGEAL REFLUX DISEASE, ESOPHAGITIS PRESENCE NOT SPECIFIED: ICD-10-CM

## 2019-04-11 LAB
ERYTHROCYTE [DISTWIDTH] IN BLOOD BY AUTOMATED COUNT: 14.4 % (ref 11.5–14.5)
ERYTHROCYTE [DISTWIDTH] IN BLOOD BY AUTOMATED COUNT: 44.4 FL (ref 35–45)
FERRITIN: 19 NG/ML (ref 10–291)
HCT VFR BLD CALC: 31.3 % (ref 37–47)
HEMOGLOBIN: 10.3 GM/DL (ref 12–16)
INR BLD: 1 (ref 0.85–1.13)
IRON: 45 UG/DL (ref 50–170)
MCH RBC QN AUTO: 28.2 PG (ref 26–33)
MCHC RBC AUTO-ENTMCNC: 32.9 GM/DL (ref 32.2–35.5)
MCV RBC AUTO: 85.8 FL (ref 81–99)
PLATELET # BLD: 343 THOU/MM3 (ref 130–400)
PMV BLD AUTO: 10.2 FL (ref 9.4–12.4)
RBC # BLD: 3.65 MILL/MM3 (ref 4.2–5.4)
WBC # BLD: 7.3 THOU/MM3 (ref 4.8–10.8)

## 2019-04-11 PROCEDURE — 99213 OFFICE O/P EST LOW 20 MIN: CPT | Performed by: NURSE PRACTITIONER

## 2019-04-11 NOTE — PROGRESS NOTES
[facility] PROFESSIONAL SERVS  PHYSICIANS [place] [address], [address]  Dept: [phone]  Dept Fax: [phone]      aMrgaret Schaeffer  53 y.o.  [id number]  4/11/19    Chief Complaint   Patient presents with    Depression    Fatigue       Current concerns - Margaret Schaeffer presents for depression follow up. Last visit two weeks ago we started her on Zoloft 50 mg daily. She states her mood is much improved despite being on her menses. She feels her depression and anxiety has greatly lifted. She is no longer having panicky feelings. She has been having painless vaginal bleeding with clots for the last 12 days, which occurred after day 4-5 of Zoloft. She thinks it might be slowing down. She also had trouble stopping bleeding with a cut on her finger. She states she has been eating a lot of high iron containing foods for this as she was getting pale and feeling very fatigued. She has lost about 10# in the last two months with increased attention to diet and exercise.  states she is not snoring as much lately. Her reflux has resolved on the Omeprazole. Wants to try off the med to see if this has resolved or if she needs to continue.      Past Medical History:   Diagnosis Date    Gall stones         Past Surgical History:   Procedure Laterality Date    NASAL FRACTURE SURGERY      TUBAL LIGATION          Family History   Problem Relation Age of Onset    Heart Disease Mother     Diabetes Maternal Grandfather     Heart Disease Paternal Grandfather     Dementia Paternal Grandfather     Diabetes Paternal Grandfather     Diabetes Maternal Grandmother         Social History     Socioeconomic History    Marital status:      Spouse name: Not on file    Number of children: Not on file    Years of education: Not on file    Highest education level: Not on file   Occupational History    Not on file   Social Needs    Financial resource strain: Not on file   Gene-Jessie insecurity:     Worry: Not on file     Inability: Not on file    Transportation needs:     Medical: Not on file     Non-medical: Not on file   Tobacco Use    Smoking status: Never Smoker    Smokeless tobacco: Never Used   Substance and Sexual Activity    Alcohol use: No    Drug use: No    Sexual activity: Yes     Partners: Male   Lifestyle    Physical activity:     Days per week: Not on file     Minutes per session: Not on file    Stress: Not on file   Relationships    Social connections:     Talks on phone: Not on file     Gets together: Not on file     Attends Mosque service: Not on file     Active member of club or organization: Not on file     Attends meetings of clubs or organizations: Not on file     Relationship status: Not on file    Intimate partner violence:     Fear of current or ex partner: Not on file     Emotionally abused: Not on file     Physically abused: Not on file     Forced sexual activity: Not on file   Other Topics Concern    Not on file   Social History Narrative    Not on file       Prior to Admission medications    Medication Sig Start Date End Date Taking? Authorizing Provider   sertraline (ZOLOFT) 50 MG tablet Take 1 tablet by mouth daily 3/28/19  Yes EMI Reyes CNP   loratadine (CLARITIN) 10 MG capsule Take 10 mg by mouth daily as needed   Yes Historical Provider, MD   ibuprofen (ADVIL;MOTRIN) 200 MG tablet Take 200 mg by mouth every 6 hours as needed for Pain   Yes Historical Provider, MD   omeprazole (PRILOSEC) 20 MG delayed release capsule Take 1 capsule by mouth every morning (before breakfast) 3/12/19  Yes EMI Reyes CNP   fluticasone (FLONASE) 50 MCG/ACT nasal spray 2 sprays by Each Nare route daily 2/12/19  Yes EMI Reyes CNP        No Known Allergies    Review of Systems - General ROS: negative for - chills, fatigue, fever or malaise  Psychological ROS: negative for - anxiety or depression.  No suicidal/homicidal thoughts. Hematological and Lymphatic ROS: No history of blood clots or bleeding disorder. Respiratory ROS: no cough, shortness of breath, or wheezing  Cardiovascular ROS: no chest pain or dyspnea on exertion  Gastrointestinal ROS: no abdominal pain, change in bowel habits, or black or bloody stools  Genito-Urinary ROS: no dysuria, trouble voiding, or hematuria. Reports heavy menstrual bleeding with clots. Musculoskeletal ROS: negative for - joint pain, joint swelling, muscle pain or muscular weakness  Neurological ROS: negative for - dizziness, gait disturbance, headaches, impaired coordination/balance, memory loss or weakness  Dermatological ROS: negative for - rash or wounds    Blood pressure 133/70, pulse 80, resp. rate 14, height 5' 5\" (1.651 m), weight 257 lb (116.6 kg). Physical Examination:   Constitutional - Alert, cooperative, well groomed, and in no distress. Vital signs stable   Vitals:    04/11/19 1359   BP: 133/70   Pulse: 80   Resp: 14     Mental status - Alert and oriented to person, place, and time. Good insight, appropriate responses, mood appropriate. Head - Atraumatic, normocephalic. Eyes - Pupils equal and round  Ears - External ears are normal, hearing is grossly normal to speech  Mouth - Oropharynx clear, mucous membranes pink and moist, dentition intact. Neck - supple, no significant adenopathy, no JVD. Chest - No distress. No increased work of breathing. Clear to auscultation in all fields, no wheezes, rales or rhonchi, symmetric air entry. Heart - normal rate, regular rhythm, normal S1, S2, no murmurs, rubs  or gallops  Abdomen -soft, nontender, nondistended  Neurological - alert, oriented, cranial nerves II-XII intact without noted deficits, motor and sensation are grossly intact bilateral upper and lower extremities.   Extremities - peripheral pulses normal, no pedal edema, no clubbing or cyanosis  Skin - warm and dry, no rashes, lesions, or wounds evident on exposed skin. No petechiae on skin. Diagnostic Data:  I have reviewed recent diagnostic testing including labs    Lab Results   Component Value Date     02/10/2019    K 4.4 02/10/2019     02/10/2019    CO2 24 02/10/2019    BUN 12 02/10/2019    CREATININE 0.7 02/10/2019    GLUCOSE 100 03/08/2019    CALCIUM 8.9 02/10/2019      Lab Results   Component Value Date    LABA1C 5.4 03/12/2019     No results found for: EAG  Lab Results   Component Value Date    CHOL 117 03/08/2019    CHOL 105 03/06/2015     Lab Results   Component Value Date    TRIG 37 03/08/2019    TRIG 27 03/06/2015     Lab Results   Component Value Date    HDL 70 03/08/2019    HDL 66 03/06/2015     Lab Results   Component Value Date    LDLCALC 40 03/08/2019    LDLCALC 34 03/06/2015     No results found for: LABVLDL, VLDL  No results found for: CHOLHDLRATIO  Lab Results   Component Value Date    WBC 8.5 02/10/2019    HGB 12.5 02/10/2019    HCT 37.9 02/10/2019    MCV 85.0 02/10/2019     02/10/2019         Assessment/Plan:   Diagnosis Orders   1. Episode of heavy vaginal bleeding  CBC    Protime-INR    Iron    Ferritin   2. Current moderate episode of major depressive disorder, unspecified whether recurrent ([facility]     3. Gastroesophageal reflux disease, esophagitis presence not specified         Orders Placed This Encounter   Procedures    CBC     Standing Status:   Future     Number of Occurrences:   1     Standing Expiration Date:   4/11/2020    Protime-INR     Standing Status:   Future     Number of Occurrences:   1     Standing Expiration Date:   4/11/2020     Order Specific Question:   Daily Coumadin Dose? Answer:   none    Iron     Standing Status:   Future     Number of Occurrences:   1     Standing Expiration Date:   4/11/2020     Order Specific Question:   Is Patient Fasting? Answer:   no     Order Specific Question:   No of Hours?      Answer:   N/A    Ferritin     Standing Status:   Future     Number of Occurrences:   1 Standing Expiration Date:   4/11/2020         1) Painless heavy menstrual bleeding   Maybe DUB, however this is associated with starting Zoloft and pt typical menses are never painless. Stop Zoloft as this has been associated with vaginal hemorrhage and platelet function abnormalities. Get stat labs including CBC, PT/INR, iron and ferritin ASAP. Will call for any abnormalities. If this continues will send her to GYN. 2) Depression and anxiety, much improved. Pt wishes to trial off Zoloft due to #1, and see how her mood does now that she has increased her activity and initial symptoms have resolved. Check on counseling covered by insurance. Need counseling if we are going to avoid SSRI dosing. 3) GERD, resolving. Let me know if reflux returns off Omeprazole, will restart. Will schedule follow up appointment in 1 month    Discussed with Dr. Jorge Ariza as he is the physician on call tonight. Electronically signed by EMI Null CNP 04/11/19 2:57 PM     [address]  [place] [address] [address], [address]     Phone number: [phone]  Fax number: [phone]

## 2019-04-11 NOTE — PATIENT INSTRUCTIONS
Stop Zoloft. Check on counseling covered by insurance. Get labs ASAP. Will call for any abnormalities. If this continues will send you to GYN.

## 2019-04-12 ENCOUNTER — TELEPHONE (OUTPATIENT)
Dept: INTERNAL MEDICINE CLINIC | Age: 49
End: 2019-04-12

## 2019-04-12 RX ORDER — FERROUS SULFATE 325(65) MG
325 TABLET ORAL
Qty: 30 TABLET | Refills: 3 | Status: SHIPPED | OUTPATIENT
Start: 2019-04-12 | End: 2019-06-11 | Stop reason: SDUPTHER

## 2019-04-12 NOTE — TELEPHONE ENCOUNTER
Patient notified of [place] recommendations and voiced understanding.  Rx sent to [facility] on [address] per patient request. She will get in with GYN right away

## 2019-04-12 NOTE — TELEPHONE ENCOUNTER
----- Message from EMI Dhillon CNP sent at 4/11/2019  4:52 PM EDT -----  Hg is down to 10.3, was 12.5 a couple months ago, bleeding time was ok. Platelets are ok.  Needs to start 325 mg ferrous sulfate daily, and send to GYN ASAP

## 2019-04-16 ENCOUNTER — TELEPHONE (OUTPATIENT)
Dept: INTERNAL MEDICINE CLINIC | Age: 49
End: 2019-04-16

## 2019-04-16 DIAGNOSIS — N93.9 EPISODE OF HEAVY VAGINAL BLEEDING: Primary | ICD-10-CM

## 2019-04-16 NOTE — TELEPHONE ENCOUNTER
----- Message from EMI Reyes - CNP sent at 4/16/2019  8:13 AM EDT -----  Could we check with pt and see if her bleeding resolved off the Zoloft? Recheck H&H now if still bleeding, if not recheck in 2 weeks.

## 2019-05-13 ENCOUNTER — OFFICE VISIT (OUTPATIENT)
Dept: INTERNAL MEDICINE CLINIC | Age: 49
End: 2019-05-13
Payer: COMMERCIAL

## 2019-05-13 ENCOUNTER — NURSE ONLY (OUTPATIENT)
Dept: LAB | Age: 49
End: 2019-05-13

## 2019-05-13 VITALS
BODY MASS INDEX: 42.95 KG/M2 | WEIGHT: 257.8 LBS | HEART RATE: 74 BPM | SYSTOLIC BLOOD PRESSURE: 130 MMHG | HEIGHT: 65 IN | DIASTOLIC BLOOD PRESSURE: 72 MMHG

## 2019-05-13 DIAGNOSIS — N93.9 EPISODE OF HEAVY VAGINAL BLEEDING: ICD-10-CM

## 2019-05-13 DIAGNOSIS — D50.9 IRON DEFICIENCY ANEMIA, UNSPECIFIED IRON DEFICIENCY ANEMIA TYPE: ICD-10-CM

## 2019-05-13 DIAGNOSIS — F41.9 ANXIETY: ICD-10-CM

## 2019-05-13 DIAGNOSIS — Z79.899 ON SSRI THERAPY: ICD-10-CM

## 2019-05-13 DIAGNOSIS — F32.1 CURRENT MODERATE EPISODE OF MAJOR DEPRESSIVE DISORDER, UNSPECIFIED WHETHER RECURRENT (HCC): Primary | ICD-10-CM

## 2019-05-13 DIAGNOSIS — K21.9 GASTROESOPHAGEAL REFLUX DISEASE, ESOPHAGITIS PRESENCE NOT SPECIFIED: ICD-10-CM

## 2019-05-13 LAB
HCT VFR BLD CALC: 36.9 % (ref 37–47)
HEMOGLOBIN: 11.8 GM/DL (ref 12–16)

## 2019-05-13 PROCEDURE — 99214 OFFICE O/P EST MOD 30 MIN: CPT | Performed by: NURSE PRACTITIONER

## 2019-05-13 RX ORDER — OMEPRAZOLE 20 MG/1
20 CAPSULE, DELAYED RELEASE ORAL
Qty: 30 CAPSULE | Refills: 5 | Status: SHIPPED | OUTPATIENT
Start: 2019-05-13

## 2019-05-13 RX ORDER — CITALOPRAM 10 MG/1
10 TABLET ORAL DAILY
Qty: 14 TABLET | Refills: 0 | Status: SHIPPED | OUTPATIENT
Start: 2019-05-13 | End: 2019-06-10 | Stop reason: SINTOL

## 2019-05-13 RX ORDER — CITALOPRAM 20 MG/1
20 TABLET ORAL DAILY
Qty: 30 TABLET | Refills: 2 | Status: SHIPPED | OUTPATIENT
Start: 2019-05-27 | End: 2019-06-10

## 2019-05-13 ASSESSMENT — PATIENT HEALTH QUESTIONNAIRE - PHQ9
SUM OF ALL RESPONSES TO PHQ9 QUESTIONS 1 & 2: 1
SUM OF ALL RESPONSES TO PHQ QUESTIONS 1-9: 1
SUM OF ALL RESPONSES TO PHQ QUESTIONS 1-9: 1
1. LITTLE INTEREST OR PLEASURE IN DOING THINGS: 1
2. FEELING DOWN, DEPRESSED OR HOPELESS: 0

## 2019-05-13 NOTE — PROGRESS NOTES
Known Allergies    Review of Systems - General ROS: negative for - chills, fatigue, fever or malaise  Respiratory ROS: no cough, shortness of breath, or wheezing  Cardiovascular ROS: no chest pain or dyspnea on exertion  Gastrointestinal ROS: no abdominal pain, change in bowel habits, or black or bloody stools  Genito-Urinary ROS: no dysuria, trouble voiding, or hematuria  Musculoskeletal ROS: negative for - joint pain, joint swelling, muscle pain or muscular weakness  Neurological ROS: negative for - confusion, dizziness, headaches, impaired coordination/balance, memory loss, numbness/tingling or weakness  Dermatological ROS: negative for - rash or wounds    Blood pressure 130/72, pulse 74, height 5' 5\" (1.651 m), weight 257 lb 12.8 oz (116.9 kg). Physical Examination:   Constitutional - Alert, cooperative, well groomed, and in no distress. Vital signs stable   Vitals:    05/13/19 1529   BP: 130/72   Pulse: 74     Mental status - Alert and oriented to person, place, and time. Good insight, appropriate responses, mood appropriate. Head - Atraumatic, normocephalic. Eyes - Pupils equal and round  Ears - External ears are normal, hearing is grossly normal to speech  Mouth - Oropharynx clear, mucous membranes pink and moist, dentition intact. Neck - supple, no significant adenopathy, no JVD. Chest - No distress. No increased work of breathing. Clear to auscultation in all fields, no wheezes, rales or rhonchi, symmetric air entry. Heart - normal rate, regular rhythm, normal S1, S2, no murmurs, rubs  or gallops  Abdomen -soft, nontender, nondistended  Neurological - alert, oriented, cranial nerves II-XII intact without noted deficits, motor and sensation are grossly intact bilateral upper and lower extremities.   Extremities - peripheral pulses normal, no pedal edema, no clubbing or cyanosis  Skin - warm and dry, no rashes, lesions, or wounds evident on exposed skin    Diagnostic Data:  I have reviewed recent diagnostic testing including labs, EKG, radiology results. Please see last EKG for interpretation, sinus rhythm, no QT prolongation. Lab Results   Component Value Date     02/10/2019    K 4.4 02/10/2019     02/10/2019    CO2 24 02/10/2019    BUN 12 02/10/2019    CREATININE 0.7 02/10/2019    GLUCOSE 100 03/08/2019    CALCIUM 8.9 02/10/2019      Lab Results   Component Value Date    LABA1C 5.4 03/12/2019     No results found for: EAG  Lab Results   Component Value Date    CHOL 117 03/08/2019    CHOL 105 03/06/2015     Lab Results   Component Value Date    TRIG 37 03/08/2019    TRIG 27 03/06/2015     Lab Results   Component Value Date    HDL 70 03/08/2019    HDL 66 03/06/2015     Lab Results   Component Value Date    LDLCALC 40 03/08/2019    LDLCALC 34 03/06/2015     No results found for: LABVLDL, VLDL  No results found for: CHOLHDLRATIO  Lab Results   Component Value Date    WBC 7.3 04/11/2019    HGB 11.8 (L) 05/13/2019    HCT 36.9 (L) 05/13/2019    MCV 85.8 04/11/2019     04/11/2019         Assessment/Plan:   Diagnosis Orders   1. Current moderate episode of major depressive disorder, unspecified whether recurrent ([facility]  External Referral To Counseling Services    citalopram (CELEXA) 20 MG tablet    citalopram (CELEXA) 10 MG tablet    Basic Metabolic Panel   2. Anxiety  External Referral To Counseling Services    citalopram (CELEXA) 20 MG tablet    citalopram (CELEXA) 10 MG tablet    Basic Metabolic Panel   3. Episode of heavy vaginal bleeding  Iron    Ferritin    Hemoglobin and Hematocrit, Blood   4. Iron deficiency anemia, unspecified iron deficiency anemia type  Iron    Ferritin    Hemoglobin and Hematocrit, Blood   5. Gastroesophageal reflux disease, esophagitis presence not specified  omeprazole (PRILOSEC) 20 MG delayed release capsule   6.  On SSRI therapy  Basic Metabolic Panel       Orders Placed This Encounter   Procedures    Iron     Standing Status:   Future     Standing Expiration Date:   5/13/2020     Order Specific Question:   Is Patient Fasting? Answer:   no     Order Specific Question:   No of Hours? Answer:   N/A    Ferritin     Standing Status:   Future     Standing Expiration Date:   5/13/2020    Hemoglobin and Hematocrit, Blood     Standing Status:   Future     Standing Expiration Date:   5/13/2020    Basic Metabolic Panel     Standing Status:   Future     Standing Expiration Date:   5/13/2020    External Referral To Counseling Services     Referral Priority:   Routine     Referral Type:   Eval and Treat     Referral Reason:   Specialty Services Required     Requested Specialty:   Clinical Counselor     Number of Visits Requested:   1     1) Depression/anxiety, improving  Celexa 10 mg daily for two weeks, then increase to 20 mg daily if no side effects. Call me if you have any problems with Celexa. EKG at next visit. Recheck BMP in one month. Continue your increased exercise and attention to diet. This will help with your depression. Counseling appointment. 2) GERD  Restart Omeprazole daily for heart burn. 3) Anemia  Recheck iron studies and blood count in one month. See GYN. Will schedule follow up appointment in 1 months. Electronically signed by EMI Llamas CNP 05/13/19 4:15 PM     [address]  [facility], [address]     Phone number: [phone]  Fax number: [phone]

## 2019-05-13 NOTE — PATIENT INSTRUCTIONS
You may receive a survey regarding the care you received during your visit. Your input is valuable to us. We encourage you to complete and return your survey. We hope you will choose us in the future for your healthcare needs. Celexa 10 mg daily for two weeks, then increase to 20 mg daily if no side effects. Call me if you have any problems with Celexa. .    Continue your increased exercise and attention to diet. This will help with your depression. Restart Omeprazole daily for heart burn. Recheck BMP, iron studies and blood count in one month. EKG at next visit. Patient Education        citalopram  Pronunciation:  jesi lazo  Brand:  Dai Alfonso  What is the most important information I should know about citalopram?  You should not use citalopram if you also take pimozide, or if you are being treated with methylene blue injection. Do not use this medicine if you have used an MAO inhibitor in the past 14 days, such as isocarboxazid, linezolid, methylene blue injection, phenelzine, rasagiline, selegiline, or tranylcypromine. Some young people have thoughts about suicide when first taking an antidepressant. Stay alert to changes in your mood or symptoms. Report any new or worsening symptoms to your doctor. Citalopram is not approved for use in children. What is citalopram?  Citalopram is an antidepressant in a group of drugs called selective serotonin reuptake inhibitors (SSRIs). Citalopram is used to treat depression. Citalopram may also be used for purposes not listed in this medication guide. What should I discuss with my healthcare provider before taking citalopram?  You should not use this medicine if you are allergic to citalopram or escitalopram (Lexapro), or if you also take pimozide. Do not use citalopram if you have used an MAO inhibitor in the past 14 days. A dangerous drug interaction could occur.  MAO inhibitors include isocarboxazid, linezolid, methylene blue injection, phenelzine, rasagiline, selegiline, tranylcypromine, and others. To make sure citalopram is safe for you, tell your doctor if you have:  · a bleeding or blood clotting disorder;  · liver or kidney disease;  · narrow-angle glaucoma;  · seizures or epilepsy;  · heart disease, heart failure, a heart rhythm disorder, slow heartbeats, or recent history of heart attack;  · personal or family history of Long QT syndrome;  · an electrolyte imbalance (such as low levels of potassium or magnesium in your blood);  · bipolar disorder (manic depression); or  · a history of drug abuse or suicidal thoughts. Some young people have thoughts about suicide when first taking an antidepressant. Your doctor should check your progress at regular visits. Your family or other caregivers should also be alert to changes in your mood or symptoms. Taking an SSRI antidepressant during pregnancy may cause serious lung problems or other complications in the baby. However, you may have a relapse of depression if you stop taking your antidepressant. Tell your doctor right away if you become pregnant. Do not start or stop taking this medicine during pregnancy without your doctor's advice. Citalopram can pass into breast milk and may harm a nursing baby. You should not breast-feed while you are using citalopram.  Do not give this medicine to anyone under 25years old without medical advice. Citalopram is not approved for use in children. How should I take citalopram?  Follow all directions on your prescription label. Your doctor may occasionally change your dose. Do not use this medicine in larger or smaller amounts or for longer than recommended. Measure liquid medicine with the dosing syringe provided, or with a special dose-measuring spoon or medicine cup. If you do not have a dose-measuring device, ask your pharmacist for one. It may take up to 4 weeks before your symptoms improve.  Keep using the medication as directed and tell your doctor if your symptoms do not improve. Do not stop using citalopram suddenly, or you could have unpleasant withdrawal symptoms. Ask your doctor how to safely stop using this medicine. Store at room temperature away from moisture and heat. What happens if I miss a dose? Take the missed dose as soon as you remember. Skip the missed dose if it is almost time for your next scheduled dose. Do not take extra medicine to make up the missed dose. What happens if I overdose? Seek emergency medical attention or call the Poison Help line at 1-735.475.3936. What should I avoid while taking citalopram?  Ask your doctor before taking a nonsteroidal anti-inflammatory drug (NSAID) for pain, arthritis, fever, or swelling. This includes aspirin, ibuprofen (Advil, Motrin), naproxen (Aleve), celecoxib (Celebrex), diclofenac, indomethacin, meloxicam, and others. Using an NSAID with citalopram may cause you to bruise or bleed easily. Drinking alcohol can increase certain side effects of citalopram.  Citalopram may impair your thinking or reactions. Be careful if you drive or do anything that requires you to be alert. What are the possible side effects of citalopram?  Get emergency medical help if you have signs of an allergic reaction: skin rash or hives; difficulty breathing; swelling of your face, lips, tongue, or throat. Report any new or worsening symptoms to your doctor, such as: mood or behavior changes, anxiety, panic attacks, trouble sleeping, or if you feel impulsive, irritable, agitated, hostile, aggressive, restless, hyperactive (mentally or physically), more depressed, or have thoughts about suicide or hurting yourself.   Call your doctor at once if you have:  · a light-headed feeling, like you might pass out;  · blurred vision, tunnel vision, eye pain or swelling, or seeing halos around lights;  · headache with chest pain and severe dizziness, fainting, fast or pounding heartbeats;  · severe nervous system reaction --very stiff (rigid) muscles, high fever, sweating, confusion, fast or uneven heartbeats, tremors, feeling like you might pass out;  · high levels of serotonin in the body --agitation, hallucinations, fever, fast heart rate, overactive reflexes, nausea, vomiting, diarrhea, loss of coordination, fainting; or  · low levels of sodium in the body --headache, confusion, slurred speech, severe weakness, vomiting, feeling unsteady. Common side effects may include:  · problems with memory or concentration;  · headache, drowsiness;  · dry mouth, increased sweating;  · numbness or tingling;  · increased appetite, nausea, diarrhea, gas;  · fast heartbeats, feeling shaky;  · sleep problems (insomnia), feeling tired;  · cold symptoms such as stuffy nose, sneezing, sore throat;  · changes in weight; or  · difficulty having an orgasm. This is not a complete list of side effects and others may occur. Call your doctor for medical advice about side effects. You may report side effects to FDA at 1-867-FDA-8693. What other drugs will affect citalopram?  Taking citalopram with other drugs that make you sleepy or slow your breathing can cause dangerous side effects or death. Ask your doctor before taking a sleeping pill, narcotic pain medicine, prescription cough medicine, a muscle relaxer, or medicine for anxiety, depression, or seizures. Many drugs can interact with citalopram. Not all possible interactions are listed here. Tell your doctor about all your current medicines and any you start or stop using, especially:  · cimetidine;  · lithium;  · Sabetha's wort;  · tryptophan (sometimes called L-tryptophan);  · a blood thinner (warfarin, Coumadin, Jantoven);  · any other antidepressant;  · heart medication;  · medicine to treat a psychiatric disorder; or  · \"triptan\" migraine headache medicine.   This list is not complete and many other drugs can interact with citalopram. This includes prescription and over-the-counter medicines, instructions adapted under license by [facility] [place] ([facility]). If you have questions about a medical condition or this instruction, always ask your healthcare professional. [address] any warranty or liability for your use of this information.

## 2019-06-10 ENCOUNTER — OFFICE VISIT (OUTPATIENT)
Dept: INTERNAL MEDICINE CLINIC | Age: 49
End: 2019-06-10
Payer: COMMERCIAL

## 2019-06-10 ENCOUNTER — NURSE ONLY (OUTPATIENT)
Dept: LAB | Age: 49
End: 2019-06-10

## 2019-06-10 VITALS
RESPIRATION RATE: 16 BRPM | BODY MASS INDEX: 42.99 KG/M2 | HEIGHT: 65 IN | HEART RATE: 87 BPM | WEIGHT: 258 LBS | SYSTOLIC BLOOD PRESSURE: 135 MMHG | DIASTOLIC BLOOD PRESSURE: 64 MMHG

## 2019-06-10 DIAGNOSIS — F32.A MILD EPISODE OF DEPRESSION: Primary | ICD-10-CM

## 2019-06-10 DIAGNOSIS — R73.09 ELEVATED GLUCOSE LEVEL: ICD-10-CM

## 2019-06-10 DIAGNOSIS — F41.9 ANXIETY: ICD-10-CM

## 2019-06-10 DIAGNOSIS — N93.9 EPISODE OF HEAVY VAGINAL BLEEDING: ICD-10-CM

## 2019-06-10 DIAGNOSIS — Z13.220 SCREENING CHOLESTEROL LEVEL: ICD-10-CM

## 2019-06-10 DIAGNOSIS — Z79.899 ON SSRI THERAPY: ICD-10-CM

## 2019-06-10 DIAGNOSIS — D50.9 IRON DEFICIENCY ANEMIA, UNSPECIFIED IRON DEFICIENCY ANEMIA TYPE: ICD-10-CM

## 2019-06-10 DIAGNOSIS — K21.9 GASTROESOPHAGEAL REFLUX DISEASE, ESOPHAGITIS PRESENCE NOT SPECIFIED: ICD-10-CM

## 2019-06-10 DIAGNOSIS — R53.83 FATIGUE, UNSPECIFIED TYPE: ICD-10-CM

## 2019-06-10 DIAGNOSIS — F32.1 CURRENT MODERATE EPISODE OF MAJOR DEPRESSIVE DISORDER, UNSPECIFIED WHETHER RECURRENT (HCC): ICD-10-CM

## 2019-06-10 LAB
ANION GAP SERPL CALCULATED.3IONS-SCNC: 14 MEQ/L (ref 8–16)
AVERAGE GLUCOSE: 99 MG/DL (ref 70–126)
BUN BLDV-MCNC: 23 MG/DL (ref 7–22)
CALCIUM SERPL-MCNC: 9.5 MG/DL (ref 8.5–10.5)
CHLORIDE BLD-SCNC: 102 MEQ/L (ref 98–111)
CHOLESTEROL, TOTAL: 114 MG/DL (ref 100–199)
CO2: 26 MEQ/L (ref 23–33)
CREAT SERPL-MCNC: 0.7 MG/DL (ref 0.4–1.2)
FERRITIN: 22 NG/ML (ref 10–291)
GFR SERPL CREATININE-BSD FRML MDRD: 89 ML/MIN/1.73M2
GLUCOSE BLD-MCNC: 94 MG/DL (ref 70–108)
HBA1C MFR BLD: 5.3 % (ref 4.4–6.4)
HCT VFR BLD CALC: 35.9 % (ref 37–47)
HDLC SERPL-MCNC: 80 MG/DL
HEMOGLOBIN: 11.4 GM/DL (ref 12–16)
IRON: 107 UG/DL (ref 50–170)
LDL CHOLESTEROL CALCULATED: 27 MG/DL
POTASSIUM SERPL-SCNC: 4.4 MEQ/L (ref 3.5–5.2)
SODIUM BLD-SCNC: 142 MEQ/L (ref 135–145)
TRIGL SERPL-MCNC: 34 MG/DL (ref 0–199)

## 2019-06-10 PROCEDURE — 99214 OFFICE O/P EST MOD 30 MIN: CPT | Performed by: NURSE PRACTITIONER

## 2019-06-10 ASSESSMENT — PATIENT HEALTH QUESTIONNAIRE - PHQ9
2. FEELING DOWN, DEPRESSED OR HOPELESS: 0
SUM OF ALL RESPONSES TO PHQ QUESTIONS 1-9: 1
1. LITTLE INTEREST OR PLEASURE IN DOING THINGS: 1
SUM OF ALL RESPONSES TO PHQ9 QUESTIONS 1 & 2: 1
SUM OF ALL RESPONSES TO PHQ QUESTIONS 1-9: 1

## 2019-06-10 NOTE — PROGRESS NOTES
[facility] PROFESSIONAL SERVS  PHYSICIANS [facility] [address], [address]  Dept: [phone]  Dept Fax: [phone]      Terry Scott  53 y.o.  [id number]  6/10/19    Chief Complaint   Patient presents with    Depression    Anxiety    Anemia    Gastroesophageal Reflux    Results     labs done today       Current concerns - Terry Scott presents for follow up of depression/anxiety. Seeing Sylvia at [facility]. She states she feels her depression and anxiety are improving. She had increased vaginal bleeding as well on the Celexa, as well as feeling very drained and fatigued. This has improved since she has weaned off the Celexa. She is taking ferrous sulfate 325 daily. No constipation. Anemia - Sees GYN Thursday for increased uterine bleeding to have ultrasound. Had her pap. GERD - Improved on Omeprazole 20 mg daily. Past Medical History:   Diagnosis Date    Gall stones           Prior to Admission medications    Medication Sig Start Date End Date Taking?  Authorizing Provider   omeprazole (PRILOSEC) 20 MG delayed release capsule Take 1 capsule by mouth every morning (before breakfast) 5/13/19  Yes EMI Jalloh CNP   ferrous sulfate 325 (65 Fe) MG tablet Take 1 tablet by mouth daily (with breakfast) 4/12/19  Yes EMI Jalloh CNP   loratadine (CLARITIN) 10 MG capsule Take 10 mg by mouth daily as needed   Yes Historical Provider, MD   ibuprofen (ADVIL;MOTRIN) 200 MG tablet Take 200 mg by mouth every 6 hours as needed for Pain   Yes Historical Provider, MD   fluticasone (FLONASE) 50 MCG/ACT nasal spray 2 sprays by Each Nare route daily 2/12/19  Yes EMI Jalloh CNP        No Known Allergies    Review of Systems - General ROS: negative for - chills, fatigue, fever or malaise  Respiratory ROS: no cough, shortness of breath, or wheezing  Cardiovascular ROS: no chest pain or dyspnea on exertion  Gastrointestinal ROS: no abdominal pain, change in bowel habits, or black or bloody stools  Genito-Urinary ROS: no dysuria, trouble voiding, or hematuria  Musculoskeletal ROS: negative for - joint pain, joint swelling, muscle pain or muscular weakness  Neurological ROS: negative for - confusion, dizziness, headaches, impaired coordination/balance, memory loss, numbness/tingling or weakness  Dermatological ROS: negative for - rash or wounds    Blood pressure 135/64, pulse 87, resp. rate 16, height 5' 5\" (1.651 m), weight 258 lb (117 kg). Physical Examination:   Constitutional - Alert, cooperative, well groomed, and in no distress. Vital signs stable   Vitals:    06/10/19 1531   BP: 135/64   Pulse: 87   Resp: 16     Mental status - Alert and oriented to person, place, and time. Good insight, appropriate responses, mood appropriate. Head - Atraumatic, normocephalic. Eyes - Pupils equal and round  Ears - External ears are normal, hearing is grossly normal to speech  Mouth - Oropharynx clear, mucous membranes pink and moist, dentition intact. Neck - supple, no significant adenopathy, no JVD. Chest - No distress. No increased work of breathing. Clear to auscultation in all fields, no wheezes, rales or rhonchi, symmetric air entry. Heart - normal rate, regular rhythm, normal S1, S2, no murmurs, rubs  or gallops  Abdomen -soft, nontender, nondistended  Neurological - alert, oriented, cranial nerves II-XII intact without noted deficits, motor and sensation are grossly intact bilateral upper and lower extremities. Extremities - peripheral pulses normal, no pedal edema, no clubbing or cyanosis  Skin - warm and dry, no rashes, lesions, or wounds evident on exposed skin    Diagnostic Data:  I have reviewed recent diagnostic testing including labs, EKG, radiology results.      Lab Results   Component Value Date     02/10/2019    K 4.4 02/10/2019     02/10/2019    CO2 24 02/10/2019    BUN 12 02/10/2019    CREATININE 0.7 02/10/2019 GLUCOSE 100 03/08/2019    CALCIUM 8.9 02/10/2019      Lab Results   Component Value Date    LABA1C 5.4 03/12/2019     No results found for: EAG  Lab Results   Component Value Date    CHOL 117 03/08/2019    CHOL 105 03/06/2015     Lab Results   Component Value Date    TRIG 37 03/08/2019    TRIG 27 03/06/2015     Lab Results   Component Value Date    HDL 70 03/08/2019    HDL 66 03/06/2015     Lab Results   Component Value Date    LDLCALC 40 03/08/2019    LDLCALC 34 03/06/2015     No results found for: LABVLDL, VLDL  No results found for: CHOLHDLRATIO  Lab Results   Component Value Date    WBC 7.3 04/11/2019    HGB 11.8 (L) 05/13/2019    HCT 36.9 (L) 05/13/2019    MCV 85.8 04/11/2019     04/11/2019         Assessment/Plan:   Diagnosis Orders   1. Mild episode of depression     2. Gastroesophageal reflux disease, esophagitis presence not specified     3. Episode of heavy vaginal bleeding     4. Iron deficiency anemia, unspecified iron deficiency anemia type     5. Fatigue, unspecified type         No orders of the defined types were placed in this encounter. 1) Depression/anxiety, improving  Off Celexa. Due to heavy vaginal bleeding on both Zoloft and Celexa at this time pt wishes to not try another antidepressant. Continue to follow with Sylvia for counseling. 2) GERD  Continue Omeprazole daily for heart burn. 3) Anemia  Follow with GYN  Await labs. 4) Fatigue, improving. Will schedule follow up appointment in 3 months. Electronically signed by EMI Dhillon CNP 06/10/19 4:05 PM     [address]  [place], [address]     Phone number: [phone]  Fax number: [phone]

## 2019-06-11 ENCOUNTER — TELEPHONE (OUTPATIENT)
Dept: INTERNAL MEDICINE CLINIC | Age: 49
End: 2019-06-11

## 2019-06-11 DIAGNOSIS — D50.9 IRON DEFICIENCY ANEMIA, UNSPECIFIED IRON DEFICIENCY ANEMIA TYPE: Primary | ICD-10-CM

## 2019-06-11 RX ORDER — FERROUS SULFATE 325(65) MG
325 TABLET ORAL 2 TIMES DAILY
Qty: 30 TABLET | Refills: 3 | COMMUNITY
Start: 2019-06-11

## 2019-06-11 NOTE — TELEPHONE ENCOUNTER
Pt notified of providers recommendations and verbalized understanding. Lab order mailed to the patient.

## 2019-06-11 NOTE — TELEPHONE ENCOUNTER
----- Message from EMI Carroll CNP sent at 6/11/2019  8:06 AM EDT -----  Let her know Hg is 11.4, ferritin still low at 22, A1C 5.3, lipids look great. Increase iron to twice daily, and recheck Hg/Hct, ferritin, iron in one month.

## 2019-10-14 ENCOUNTER — HOSPITAL ENCOUNTER (OUTPATIENT)
Age: 49
Discharge: HOME | End: 2019-10-14
Payer: COMMERCIAL

## 2019-10-14 DIAGNOSIS — D50.9 IRON DEFICIENCY ANEMIA, UNSPECIFIED IRON DEFICIENCY ANEMIA TYPE: ICD-10-CM

## 2019-10-14 LAB
FERRITIN: 31 NG/ML (ref 10–291)
HCT VFR BLD CALC: 35.5 % (ref 37–47)
HEMOGLOBIN: 11.3 GM/DL (ref 12–16)
IRON: 59 UG/DL (ref 50–170)

## 2019-10-14 PROCEDURE — 36415 COLL VENOUS BLD VENIPUNCTURE: CPT

## 2019-10-14 PROCEDURE — 85014 HEMATOCRIT: CPT

## 2019-10-14 PROCEDURE — 83540 ASSAY OF IRON: CPT

## 2019-10-14 PROCEDURE — 85018 HEMOGLOBIN: CPT

## 2019-10-14 PROCEDURE — 82728 ASSAY OF FERRITIN: CPT

## 2019-10-15 ENCOUNTER — OFFICE VISIT (OUTPATIENT)
Dept: INTERNAL MEDICINE CLINIC | Age: 49
End: 2019-10-15
Payer: COMMERCIAL

## 2019-10-15 VITALS
HEIGHT: 65 IN | SYSTOLIC BLOOD PRESSURE: 116 MMHG | OXYGEN SATURATION: 98 % | BODY MASS INDEX: 41.82 KG/M2 | WEIGHT: 251 LBS | DIASTOLIC BLOOD PRESSURE: 74 MMHG | HEART RATE: 72 BPM

## 2019-10-15 DIAGNOSIS — K21.9 GASTROESOPHAGEAL REFLUX DISEASE, ESOPHAGITIS PRESENCE NOT SPECIFIED: ICD-10-CM

## 2019-10-15 DIAGNOSIS — F32.A MILD EPISODE OF DEPRESSION: Primary | ICD-10-CM

## 2019-10-15 DIAGNOSIS — D50.9 IRON DEFICIENCY ANEMIA, UNSPECIFIED IRON DEFICIENCY ANEMIA TYPE: ICD-10-CM

## 2019-10-15 PROCEDURE — 99213 OFFICE O/P EST LOW 20 MIN: CPT | Performed by: NURSE PRACTITIONER

## 2019-10-16 ASSESSMENT — ENCOUNTER SYMPTOMS
SHORTNESS OF BREATH: 0
SORE THROAT: 0
DIARRHEA: 0
NAUSEA: 0
CONSTIPATION: 0
COUGH: 0
CHOKING: 0
ABDOMINAL PAIN: 0
EYE ITCHING: 0
VOMITING: 0
TROUBLE SWALLOWING: 0

## 2020-04-03 ENCOUNTER — TELEPHONE (OUTPATIENT)
Dept: INTERNAL MEDICINE CLINIC | Age: 50
End: 2020-04-03

## 2020-05-11 ENCOUNTER — TELEPHONE (OUTPATIENT)
Dept: INTERNAL MEDICINE CLINIC | Age: 50
End: 2020-05-11

## 2020-05-11 RX ORDER — AMOXICILLIN 875 MG/1
875 TABLET, COATED ORAL 2 TIMES DAILY
Qty: 20 TABLET | Refills: 0 | Status: SHIPPED | OUTPATIENT
Start: 2020-05-11 | End: 2020-05-21

## 2020-05-11 NOTE — TELEPHONE ENCOUNTER
Pt with s/s consistent with sinusitis. Sending Amoxicillin 875 mg BID x 10 days to [facility]. Pt instructed as follows for symptom control per my chart message from 5/11/20 at 12:23 pm.   I dont' see any antibiotic allergies, I'm going to treat you for sinusitis with Amoxicillin 875 mg twice daily for 10 days. Can use Mucinex DM for cough and to thin secretions if needed  Plenty of fluids, rest.   Tylenol and/or Ibuprofen for fever/body aches  Salt water gargles for sore throat  Saline nasal spray 2 sprays each nostril 2-3 times daily      Call if no improvement.

## 2021-02-09 ENCOUNTER — OFFICE VISIT (OUTPATIENT)
Dept: INTERNAL MEDICINE CLINIC | Age: 51
End: 2021-02-09
Payer: COMMERCIAL

## 2021-02-09 VITALS
DIASTOLIC BLOOD PRESSURE: 70 MMHG | WEIGHT: 238 LBS | SYSTOLIC BLOOD PRESSURE: 144 MMHG | HEIGHT: 64 IN | TEMPERATURE: 97 F | BODY MASS INDEX: 40.63 KG/M2 | OXYGEN SATURATION: 98 % | HEART RATE: 64 BPM

## 2021-02-09 DIAGNOSIS — R09.89 CHEST CONGESTION: Primary | ICD-10-CM

## 2021-02-09 DIAGNOSIS — U07.1 COVID-19: ICD-10-CM

## 2021-02-09 DIAGNOSIS — J01.10 ACUTE FRONTAL SINUSITIS, RECURRENCE NOT SPECIFIED: ICD-10-CM

## 2021-02-09 PROCEDURE — 99213 OFFICE O/P EST LOW 20 MIN: CPT | Performed by: NURSE PRACTITIONER

## 2021-02-09 RX ORDER — PSEUDOEPHEDRINE HYDROCHLORIDE 30 MG/1
30 TABLET ORAL EVERY 4 HOURS PRN
COMMUNITY

## 2021-02-09 RX ORDER — ALBUTEROL SULFATE 90 UG/1
2 AEROSOL, METERED RESPIRATORY (INHALATION) 4 TIMES DAILY PRN
Qty: 1 INHALER | Refills: 0 | Status: SHIPPED | OUTPATIENT
Start: 2021-02-09 | End: 2021-05-13

## 2021-02-09 RX ORDER — AZITHROMYCIN 250 MG/1
250 TABLET, FILM COATED ORAL SEE ADMIN INSTRUCTIONS
Qty: 6 TABLET | Refills: 0 | Status: CANCELLED | OUTPATIENT
Start: 2021-02-09 | End: 2021-02-14

## 2021-02-09 RX ORDER — GUAIFENESIN AND DEXTROMETHORPHAN HYDROBROMIDE 1200; 60 MG/1; MG/1
1 TABLET, EXTENDED RELEASE ORAL PRN
COMMUNITY

## 2021-02-09 RX ORDER — PREDNISONE 20 MG/1
20 TABLET ORAL DAILY
Qty: 5 TABLET | Refills: 0 | Status: SHIPPED | OUTPATIENT
Start: 2021-02-09 | End: 2021-02-14

## 2021-02-09 RX ORDER — METHYLPREDNISOLONE 4 MG/1
TABLET ORAL
Qty: 1 KIT | Refills: 0 | Status: CANCELLED | OUTPATIENT
Start: 2021-02-09 | End: 2021-02-15

## 2021-02-09 ASSESSMENT — PATIENT HEALTH QUESTIONNAIRE - PHQ9
SUM OF ALL RESPONSES TO PHQ QUESTIONS 1-9: 0
2. FEELING DOWN, DEPRESSED OR HOPELESS: 0
SUM OF ALL RESPONSES TO PHQ QUESTIONS 1-9: 0
SUM OF ALL RESPONSES TO PHQ9 QUESTIONS 1 & 2: 0

## 2021-02-09 NOTE — PATIENT INSTRUCTIONS
Increase activity over the coming week to improve your stamina. Albuterol 2 puffs every 4-6 hours for feeling of chest tightness. Prednisone 20 mg daily for 5 days. Can use Mucinex DM for cough and to thin secretions if needed  Plenty of fluids, rest.   Tylenol  for fever/body aches  Salt water gargles for sore throat  Saline nasal spray 2 sprays each nostril 2-3 times daily    Any development of worsening shortness of breath, cough, notify me. Off work one more week, see me back to be cleared to return. Steps to help prevent the spread of COVID-19 if you are sick  SOURCE - https://charles-norris.info/. html     Stay home except to get medical care   ; Stay home: People who are mildly ill with COVID-19 are able to isolate at home during their illness. You should restrict activities outside your home, except for getting medical care.   ; Avoid public areas: Do not go to work, school, or public areas.   ; Avoid public transportation: Avoid using public transportation, ride-sharing, or taxis.  ; Separate yourself from other people and animals in your home   ; Stay away from others: As much as possible, you should stay in a specific room and away from other people in your home. Also, you should use a separate bathroom, if available.   ; Limit contact with pets & animals: You should restrict contact with pets and other animals while you are sick with COVID-19, just like you would around other people. Although there have not been reports of pets or other animals becoming sick with COVID-19, it is still recommended that people sick with COVID-19 limit contact with animals until more information is known about the virus. ; When possible, have another member of your household care for your animals while you are sick. If you are sick with COVID-19, avoid contact with your pet, including petting, snuggling, being kissed or licked, and sharing food. If you must care for your pet or be around animals while you are sick, wash your hands before and after you interact with pets and wear a facemask. See COVID-19 and Animals for more information. Other considerations  ? The ill person should eat/be fed in their room if possible. Non-disposable  items used should be handled with gloves and washed with hot water or in a . Clean hands after handling used  items. ? If possible, dedicate a lined trash can for the ill person. Use gloves when removing garbage bags, handling, and disposing of trash. Wash hands after handling or disposing of trash. ? Consider consulting with your local health department about trash disposal guidance if available. Information for Household Members and Caregivers of Someone who is Sick   Call ahead before visiting your doctor   Call ahead: If you have a medical appointment, call the healthcare provider and tell them that you have or may have COVID-19. This will help the healthcare provider's office take steps to keep other people from getting infected or exposed. Wear a facemask if you are sick   ; If you are sick: You should wear a facemask when you are around other people (e.g., sharing a room or vehicle) or pets and before you enter a healthcare provider's office. ; If you are caring for others: If the person who is sick is not able to wear a facemask (for example, because it causes trouble breathing), then people who live with the person who is sick should not stay in the same room with them, or they should wear a facemask if they enter a room with the person who is sick.   Cover your coughs and sneezes ; Cover: Cover your mouth and nose with a tissue when you cough or sneeze.   ; Dispose: Throw used tissues in a lined trash can.   ; Wash hands: Immediately wash your hands with soap and water for at least 20 seconds or, if soap and water are not available, clean your hands with an alcohol-based hand  that contains at least 60% alcohol. Clean your hands often   ; Wash hands: Wash your hands often with soap and water for at least 20 seconds, especially after blowing your nose, coughing, or sneezing; going to the bathroom; and before eating or preparing food.   ; Hand : If soap and water are not readily available, use an alcohol-based hand  with at least 60% alcohol, covering all surfaces of your hands and rubbing them together until they feel dry.   ; Soap and water: Soap and water are the best option if hands are visibly dirty.   ; Avoid touching: Avoid touching your eyes, nose, and mouth with unwashed hands. Handwashing Tips   ; Wet your hands with clean, running water (warm or cold), turn off the tap, and apply soap.  ; Lather your hands by rubbing them together with the soap. Lather the backs of your hands, between your fingers, and under your nails. ; Scrub your hands for at least 20 seconds. Need a timer? Hum the [place] from beginning to end twice.  ; Rinse your hands well under clean, running water.  ; Dry your hands using a clean towel or air dry them. Avoid sharing personal household items   ; Do not share: You should not share dishes, drinking glasses, cups, eating utensils, towels, or bedding with other people or pets in your home.   ; Wash thoroughly after use: After using these items, they should be washed thoroughly with soap and water. Clean all high-touch surfaces everyday   ; Clean and disinfect: Practice routine cleaning of high touch surfaces. ; High touch surfaces include counters, tabletops, doorknobs, bathroom fixtures, toilets, phones, keyboards, tablets, and bedside tables.  ; Disinfect areas with bodily fluids: Also, clean any surfaces that may have blood, stool, or body fluids on them.   ; Household : Use a household cleaning spray or wipe, according to the label instructions. Labels contain instructions for safe and effective use of the cleaning product including precautions you should take when applying the product, such as wearing gloves and making sure you have good ventilation during use of the product. Monitor your symptoms   Seek medical attention: Seek prompt medical attention if your illness is worsening     (e.g., difficulty breathing).   ; Call your doctor: Before seeking care, call your healthcare provider and tell them that you have, or are being evaluated for, COVID-19.   ; Wear a facemask when sick: Put on a facemask before you enter the facility. These steps will help the healthcare provider's office to keep other people in the office or waiting room from getting infected or exposed. ; Alert health department: Ask your healthcare provider to call the local or state health department. Persons who are placed under active monitoring or facilitated self-monitoring should follow instructions provided by their local health department or occupational health professionals, as appropriate.  ; Call 911 if you have a medical emergency: If you have a medical emergency and need to call 911, notify the dispatch personnel that you have, or are being evaluated for COVID-19. If possible, put on a facemask before emergency medical services arrive.

## 2021-02-09 NOTE — PROGRESS NOTES
Melissa Quick  INTERNAL MEDICINE  [address] [facility] [address]  Dept: [phone]  Dept Fax: [phone] : [phone]     Visit Date:  2/9/2021    Patient:  Viktor Cruz  YOB: 1970    HPI:     Chief Complaint   Patient presents with    Chest Congestion       HPI    COVID two weeks ago. She went to work and was ill. She had a cough, fever, diarrhea, SOB, body aches, loss of taste/smell. Just got her taste and smell back. Went back to work yesterday but felt she couldn't do her job. She continues with chest heaviness, head congestion, mouth breathing, fatigue. Some headaches, takes Tylenol for this. Takes Sudafed, Mucinex. SPO2 98%. Medications    Current Outpatient Medications:     Dextromethorphan-guaiFENesin (MUCINEX DM MAXIMUM STRENGTH)  MG TB12, Take 1 tablet by mouth 2 times daily, Disp: , Rfl:     pseudoephedrine (SUDAFED) 30 MG tablet, Take 30 mg by mouth every 4 hours as needed for Congestion, Disp: , Rfl:     albuterol sulfate HFA (VENTOLIN HFA) 108 (90 Base) MCG/ACT inhaler, Inhale 2 puffs into the lungs 4 times daily as needed for Wheezing, Disp: 1 Inhaler, Rfl: 0    ibuprofen (ADVIL;MOTRIN) 200 MG tablet, Take 200 mg by mouth every 6 hours as needed for Pain, Disp: , Rfl:     ferrous sulfate 325 (65 Fe) MG tablet, Take 1 tablet by mouth 2 times daily, Disp: 30 tablet, Rfl: 3    omeprazole (PRILOSEC) 20 MG delayed release capsule, Take 1 capsule by mouth every morning (before breakfast), Disp: 30 capsule, Rfl: 5    loratadine (CLARITIN) 10 MG capsule, Take 10 mg by mouth daily as needed, Disp: , Rfl:     fluticasone (FLONASE) 50 MCG/ACT nasal spray, 2 sprays by Each Nare route daily, Disp: 1 Bottle, Rfl: 0    The patient has No Known Allergies. Past Medical History  Vinnie Lorenzo  has a past medical history of Gall stones.     Past Surgical History The patient  has a past surgical history that includes Tubal ligation and Nasal fracture surgery. Family History  This patient's family history includes Dementia in her paternal grandfather; Diabetes in her maternal grandfather, maternal grandmother, and paternal grandfather; Heart Disease in her mother and paternal grandfather. Social History  Leeroy Alva  reports that she has never smoked. She has never used smokeless tobacco. She reports that she does not drink alcohol or use drugs. Health Maintenance:    Health Maintenance   Topic Date Due    Hepatitis C screen  1970    Pneumococcal 0-64 years Vaccine (1 of 1 - PPSV23) 11/18/1977    HIV screen  11/18/1986    Cervical cancer screen  11/18/1992    Flu vaccine (1) 09/01/2020    Lipid screen  02/18/2026    DTaP/Tdap/Td vaccine (2 - Td) 12/18/2026    Hepatitis A vaccine  Aged Out    Hepatitis B vaccine  Aged Out    Hib vaccine  Aged Out    Meningococcal (ACWY) vaccine  Aged Out       Subjective:      Review of Systems   Constitutional: Positive for fatigue. Negative for chills and fever. HENT: Positive for congestion and postnasal drip. Negative for sore throat and trouble swallowing. Respiratory: Positive for cough and chest tightness. Negative for shortness of breath (Exertional since being ill ). Cardiovascular: Negative for chest pain and leg swelling. Gastrointestinal: Negative for abdominal pain, diarrhea, nausea and vomiting. Genitourinary: Negative for difficulty urinating and dysuria. Musculoskeletal: Negative for arthralgias and myalgias. Skin: Negative for rash and wound. Neurological: Positive for headaches. Negative for dizziness, weakness and light-headedness.        Objective:     BP (!) 144/70 (Site: Right Upper Arm, Position: Sitting, Cuff Size: Large Adult)   Pulse 64   Temp 97 °F (36.1 °C) (Temporal)   Ht 5' 4\" (1.626 m)   Wt 238 lb (108 kg)   SpO2 98%   BMI 40.85 kg/m²     Physical Exam  Constitutional: General: She is not in acute distress. Appearance: Normal appearance. She is not ill-appearing or toxic-appearing. HENT:      Right Ear: Tympanic membrane, ear canal and external ear normal.      Left Ear: Tympanic membrane, ear canal and external ear normal.      Nose: Congestion present. No rhinorrhea. Mouth/Throat:      Mouth: Mucous membranes are moist.      Pharynx: Oropharynx is clear. No oropharyngeal exudate or posterior oropharyngeal erythema. Neck:      Musculoskeletal: Neck supple. Cardiovascular:      Rate and Rhythm: Normal rate and regular rhythm. Pulses: Normal pulses. Heart sounds: Normal heart sounds. No murmur. No gallop. Pulmonary:      Effort: Pulmonary effort is normal.      Breath sounds: Wheezing (Scattered, fine) present. Abdominal:      General: There is no distension. Palpations: Abdomen is soft. Tenderness: There is no abdominal tenderness. There is no right CVA tenderness or left CVA tenderness. Lymphadenopathy:      Cervical: No cervical adenopathy. Skin:     General: Skin is warm and dry. Findings: No rash. Neurological:      General: No focal deficit present. Mental Status: She is alert and oriented to person, place, and time. Coordination: Coordination normal.         Labs Reviewed 2/9/2021:    Lab Results   Component Value Date    WBC 7.6 02/18/2021    HGB 13.1 02/18/2021    HCT 38.9 02/18/2021     02/18/2021    CHOL 114 06/10/2019    TRIG 34 06/10/2019    HDL 69 02/18/2021    ALT 27 02/18/2021    AST 18 02/18/2021     02/18/2021    K 4.1 02/18/2021     02/18/2021    CREATININE 0.6 02/18/2021    BUN 16 02/18/2021    CO2 25 02/18/2021    TSH 1.420 03/16/2019    INR 1.00 04/11/2019    GLUF 109 (H) 02/18/2021    LABA1C 5.3 06/10/2019       Assessment/Plan      1.  Chest congestion - albuterol sulfate HFA (VENTOLIN HFA) 108 (90 Base) MCG/ACT inhaler; Inhale 2 puffs into the lungs 4 times daily as needed for Wheezing  Dispense: 1 Inhaler; Refill: 0  - predniSONE (DELTASONE) 20 MG tablet; Take 1 tablet by mouth daily for 5 days  Dispense: 5 tablet; Refill: 0    2. Acute frontal sinusitis, recurrence not specified  Likely viral related. 3. COVID-19  Increase activity over the coming week to improve your stamina. Albuterol 2 puffs every 4-6 hours for feeling of chest tightness. Prednisone 20 mg daily for 5 days. Can use Mucinex DM for cough and to thin secretions if needed  Plenty of fluids, rest.   Tylenol  for fever/body aches  Salt water gargles for sore throat  Saline nasal spray 2 sprays each nostril 2-3 times daily    Any development of worsening shortness of breath, cough, notify me. Off work one more week, see me back to be cleared to return. - predniSONE (DELTASONE) 20 MG tablet; Take 1 tablet by mouth daily for 5 days  Dispense: 5 tablet; Refill: 0    Steps to help prevent the spread of COVID-19 if you are sick  SOURCE - https://charles-norris.info/. html     Stay home except to get medical care   ; Stay home: People who are mildly ill with COVID-19 are able to isolate at home during their illness. You should restrict activities outside your home, except for getting medical care.   ; Avoid public areas: Do not go to work, school, or public areas.   ; Avoid public transportation: Avoid using public transportation, ride-sharing, or taxis.  ; Separate yourself from other people and animals in your home   ; Stay away from others: As much as possible, you should stay in a specific room and away from other people in your home. Also, you should use a separate bathroom, if available. ; If you are caring for others: If the person who is sick is not able to wear a facemask (for example, because it causes trouble breathing), then people who live with the person who is sick should not stay in the same room with them, or they should wear a facemask if they enter a room with the person who is sick. Cover your coughs and sneezes   ; Cover: Cover your mouth and nose with a tissue when you cough or sneeze.   ; Dispose: Throw used tissues in a lined trash can.   ; Wash hands: Immediately wash your hands with soap and water for at least 20 seconds or, if soap and water are not available, clean your hands with an alcohol-based hand  that contains at least 60% alcohol. Clean your hands often   ; Wash hands: Wash your hands often with soap and water for at least 20 seconds, especially after blowing your nose, coughing, or sneezing; going to the bathroom; and before eating or preparing food.   ; Hand : If soap and water are not readily available, use an alcohol-based hand  with at least 60% alcohol, covering all surfaces of your hands and rubbing them together until they feel dry.   ; Soap and water: Soap and water are the best option if hands are visibly dirty.   ; Avoid touching: Avoid touching your eyes, nose, and mouth with unwashed hands. Handwashing Tips   ; Wet your hands with clean, running water (warm or cold), turn off the tap, and apply soap.  ; Lather your hands by rubbing them together with the soap. Lather the backs of your hands, between your fingers, and under your nails. ; Scrub your hands for at least 20 seconds. Need a timer? Hum the [place] from beginning to end twice.  ; Rinse your hands well under clean, running water.  ; Dry your hands using a clean towel or air dry them.   Avoid sharing personal household items ; Do not share: You should not share dishes, drinking glasses, cups, eating utensils, towels, or bedding with other people or pets in your home.   ; Wash thoroughly after use: After using these items, they should be washed thoroughly with soap and water. Clean all high-touch surfaces everyday   ; Clean and disinfect: Practice routine cleaning of high touch surfaces.  ; High touch surfaces include counters, tabletops, doorknobs, bathroom fixtures, toilets, phones, keyboards, tablets, and bedside tables.  ; Disinfect areas with bodily fluids: Also, clean any surfaces that may have blood, stool, or body fluids on them.   ; Household : Use a household cleaning spray or wipe, according to the label instructions. Labels contain instructions for safe and effective use of the cleaning product including precautions you should take when applying the product, such as wearing gloves and making sure you have good ventilation during use of the product. Monitor your symptoms   Seek medical attention: Seek prompt medical attention if your illness is worsening     (e.g., difficulty breathing).   ; Call your doctor: Before seeking care, call your healthcare provider and tell them that you have, or are being evaluated for, COVID-19.   ; Wear a facemask when sick: Put on a facemask before you enter the facility. These steps will help the healthcare provider's office to keep other people in the office or waiting room from getting infected or exposed. ; Alert health department: Ask your healthcare provider to call the local or state health department. Persons who are placed under active monitoring or facilitated self-monitoring should follow instructions provided by their local health department or occupational health professionals, as appropriate. ; Call 911 if you have a medical emergency: If you have a medical emergency and need to call 911, notify the dispatch personnel that you have, or are being evaluated for COVID-19. If possible, put on a facemask before emergency medical services arrive. Return in about 6 days (around 2/15/2021). Patient given educational materials - see patient instructions. Discussed use, benefit, and side effects of prescribed medications. All patient questions answered. Pt voiced understanding.      Electronically signed EMI Mukherjee CNP on 2/9/21 at 10:41 AM EST

## 2021-02-15 ENCOUNTER — OFFICE VISIT (OUTPATIENT)
Dept: INTERNAL MEDICINE CLINIC | Age: 51
End: 2021-02-15
Payer: COMMERCIAL

## 2021-02-15 VITALS
HEIGHT: 64 IN | SYSTOLIC BLOOD PRESSURE: 147 MMHG | DIASTOLIC BLOOD PRESSURE: 68 MMHG | TEMPERATURE: 97.3 F | HEART RATE: 88 BPM | BODY MASS INDEX: 41.32 KG/M2 | WEIGHT: 242 LBS

## 2021-02-15 DIAGNOSIS — E66.1 CLASS 3 DRUG-INDUCED OBESITY WITHOUT SERIOUS COMORBIDITY WITH BODY MASS INDEX (BMI) OF 40.0 TO 44.9 IN ADULT (HCC): ICD-10-CM

## 2021-02-15 DIAGNOSIS — U07.1 COVID-19: Primary | ICD-10-CM

## 2021-02-15 DIAGNOSIS — D50.9 IRON DEFICIENCY ANEMIA, UNSPECIFIED IRON DEFICIENCY ANEMIA TYPE: ICD-10-CM

## 2021-02-15 PROCEDURE — 99214 OFFICE O/P EST MOD 30 MIN: CPT | Performed by: NURSE PRACTITIONER

## 2021-02-15 SDOH — ECONOMIC STABILITY: FOOD INSECURITY: WITHIN THE PAST 12 MONTHS, YOU WORRIED THAT YOUR FOOD WOULD RUN OUT BEFORE YOU GOT MONEY TO BUY MORE.: NEVER TRUE

## 2021-02-15 SDOH — ECONOMIC STABILITY: TRANSPORTATION INSECURITY
IN THE PAST 12 MONTHS, HAS THE LACK OF TRANSPORTATION KEPT YOU FROM MEDICAL APPOINTMENTS OR FROM GETTING MEDICATIONS?: NO

## 2021-02-15 SDOH — ECONOMIC STABILITY: INCOME INSECURITY: HOW HARD IS IT FOR YOU TO PAY FOR THE VERY BASICS LIKE FOOD, HOUSING, MEDICAL CARE, AND HEATING?: NOT HARD AT ALL

## 2021-02-15 NOTE — PROGRESS NOTES
[address] INTERNAL MEDICINE  [address] [facility] [address]  Dept: [phone]  Dept Fax: [phone] : [phone]     Visit Date:  2/15/2021    Patient:  Chester Benavides  YOB: 1970    HPI:     Chief Complaint   Patient presents with    Other     COVID follow up       HPI    She is here to follow up for COVID. Continues with headaches, does not think Tylenol is helping. If she doesn't take Mucinex and Sudafed her cough and congestion increases. No discolored drainage. Continues with right shoulder pain with cough, and some chest heaviness. She states she is slowly improving but does not feel     Anemia - Last Hg 11.3% 10/14/2019. Medications    Current Outpatient Medications:     Dextromethorphan-guaiFENesin (MUCINEX DM MAXIMUM STRENGTH)  MG TB12, Take 1 tablet by mouth 2 times daily, Disp: , Rfl:     pseudoephedrine (SUDAFED) 30 MG tablet, Take 30 mg by mouth every 4 hours as needed for Congestion, Disp: , Rfl:     albuterol sulfate HFA (VENTOLIN HFA) 108 (90 Base) MCG/ACT inhaler, Inhale 2 puffs into the lungs 4 times daily as needed for Wheezing, Disp: 1 Inhaler, Rfl: 0    ferrous sulfate 325 (65 Fe) MG tablet, Take 1 tablet by mouth 2 times daily, Disp: 30 tablet, Rfl: 3    omeprazole (PRILOSEC) 20 MG delayed release capsule, Take 1 capsule by mouth every morning (before breakfast), Disp: 30 capsule, Rfl: 5    loratadine (CLARITIN) 10 MG capsule, Take 10 mg by mouth daily as needed, Disp: , Rfl:     ibuprofen (ADVIL;MOTRIN) 200 MG tablet, Take 200 mg by mouth every 6 hours as needed for Pain, Disp: , Rfl:     fluticasone (FLONASE) 50 MCG/ACT nasal spray, 2 sprays by Each Nare route daily, Disp: 1 Bottle, Rfl: 0    The patient has No Known Allergies. Past Medical History  Gia Quintero  has a past medical history of Gall stones.     Past Surgical History  The patient  has a past surgical history that includes Tubal ligation and Nasal fracture surgery. Family History  This patient's family history includes Dementia in her paternal grandfather; Diabetes in her maternal grandfather, maternal grandmother, and paternal grandfather; Heart Disease in her mother and paternal grandfather. Social History  Marlise Para  reports that she has never smoked. She has never used smokeless tobacco. She reports that she does not drink alcohol or use drugs. Health Maintenance:    Health Maintenance   Topic Date Due    Hepatitis C screen  1970    Pneumococcal 0-64 years Vaccine (1 of 1 - PPSV23) 11/18/1977    HIV screen  11/18/1986    Cervical cancer screen  11/18/1992    Flu vaccine (1) 09/01/2020    Lipid screen  02/18/2026    DTaP/Tdap/Td vaccine (2 - Td) 12/18/2026    Hepatitis A vaccine  Aged Out    Hepatitis B vaccine  Aged Out    Hib vaccine  Aged Out    Meningococcal (ACWY) vaccine  Aged Out       Subjective:      Review of Systems   Constitutional: Negative for chills, fatigue and fever. HENT: Positive for congestion, rhinorrhea, sinus pressure and sinus pain. Negative for sore throat and trouble swallowing. Respiratory: Positive for cough and chest tightness. Negative for shortness of breath. Cardiovascular: Negative for chest pain and leg swelling. Gastrointestinal: Negative for abdominal pain, diarrhea, nausea and vomiting. Genitourinary: Negative for difficulty urinating and dysuria. Musculoskeletal: Negative for arthralgias and myalgias. Skin: Negative for rash and wound. Neurological: Negative for dizziness, weakness, light-headedness and headaches. Psychiatric/Behavioral: Negative for agitation, behavioral problems and dysphoric mood.        Objective:     BP (!) 147/68 (Site: Right Upper Arm, Position: Sitting, Cuff Size: Large Adult)   Pulse 88   Temp 97.3 °F (36.3 °C) (Temporal)   Ht 5' 4\" (1.626 m)   Wt 242 lb (109.8 kg)   BMI 41.54 kg/m²     Physical Exam    Labs Reviewed 2/15/2021:    Lab Results   Component Value Date    WBC 7.6 02/18/2021    HGB 13.1 02/18/2021    HCT 38.9 02/18/2021     02/18/2021    CHOL 114 06/10/2019    TRIG 34 06/10/2019    HDL 69 02/18/2021    ALT 27 02/18/2021    AST 18 02/18/2021     02/18/2021    K 4.1 02/18/2021     02/18/2021    CREATININE 0.6 02/18/2021    BUN 16 02/18/2021    CO2 25 02/18/2021    TSH 1.420 03/16/2019    INR 1.00 04/11/2019    GLUF 109 (H) 02/18/2021    LABA1C 5.3 06/10/2019     Results for Ashwini Lopez (MRN [id number]) as of 2/15/2021 10:53   Ref. Range 4/11/2019 15:05 5/13/2019 11:25 6/10/2019 14:55 10/14/2019 15:40   Ferritin Latest Ref Range: 10 - 291 ng/mL 19  22 31   Iron Latest Ref Range: 50 - 170 ug/dL 45 (L)  107 59     Assessment/Plan      1. COVID-19  Lingering symptoms  Can use Mucinex DM or Robitussin DM for cough and to thin secretions if needed  Deep breath and cough, 10 times daily to keep lung capacity. Use inhaler as needed. Plenty of fluids, rest.   Tylenol for fever/body aches  Salt water gargles for sore throat  Saline nasal spray 2 sprays each nostril 2-3 times daily   Zinc 30 mg daily, Pepcid 20 mg daily, vitamin C 1000 mg twice daily, vitamin D3 5000 units daily, melatonin 10 mg at night, per Covid regimen. If you notice worsening symptoms such as development of fever, discolored sputum, worsening fatigue, shortness of breath or chest pain - be seen urgently. Virtual visit in a week for return to work, tentative 2/23, have BP and temperature check ready for that visit. - CBC With Auto Differential; Future  - Comprehensive Metabolic Panel, Fasting; Future    2. Iron deficiency anemia, unspecified iron deficiency anemia type  - CBC With Auto Differential; Future  - Ferritin; Future  - Iron; Future    3. 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 Patient given educational materials - see patient instructions. Discussed use, benefit, and side effects of prescribed medications. All patient questions answered. Pt voiced understanding.        Electronically signed EMI Roach CNP on 2/15/21 at 10:46 AM EST

## 2021-02-15 NOTE — PATIENT INSTRUCTIONS
Can use Mucinex DM or Robitussin DM for cough and to thin secretions if needed  Deep breath and cough, 10 times daily to keep lung capacity. Use inhaler as needed. Plenty of fluids, rest.   Tylenol for fever/body aches  Salt water gargles for sore throat  Saline nasal spray 2 sprays each nostril 2-3 times daily   Zinc 30 mg daily, Pepcid 20 mg daily, vitamin C 1000 mg twice daily, vitamin D3 5000 units daily, melatonin 10 mg at night, per Covid regimen. If you notice worsening symptoms such as development of fever, discolored sputum, worsening fatigue, shortness of breath or chest pain - be seen urgently. Virtual visit in a week for return to work, tentative 2/23, have BP and temperature check ready for that visit.

## 2021-02-18 ENCOUNTER — HOSPITAL ENCOUNTER (OUTPATIENT)
Age: 51
Discharge: HOME | End: 2021-02-18
Payer: COMMERCIAL

## 2021-02-18 DIAGNOSIS — U07.1 COVID-19: ICD-10-CM

## 2021-02-18 DIAGNOSIS — E66.1 CLASS 3 DRUG-INDUCED OBESITY WITHOUT SERIOUS COMORBIDITY WITH BODY MASS INDEX (BMI) OF 40.0 TO 44.9 IN ADULT (HCC): ICD-10-CM

## 2021-02-18 DIAGNOSIS — D50.9 IRON DEFICIENCY ANEMIA, UNSPECIFIED IRON DEFICIENCY ANEMIA TYPE: ICD-10-CM

## 2021-02-18 LAB
ALBUMIN SERPL-MCNC: 3.7 G/DL (ref 3.5–5.1)
ALP BLD-CCNC: 72 U/L (ref 38–126)
ALT SERPL-CCNC: 27 U/L (ref 11–66)
ANION GAP SERPL CALCULATED.3IONS-SCNC: 10 MEQ/L (ref 8–16)
AST SERPL-CCNC: 18 U/L (ref 5–40)
BASOPHILS # BLD: 0.4 %
BASOPHILS ABSOLUTE: 0 THOU/MM3 (ref 0–0.1)
BILIRUB SERPL-MCNC: 0.3 MG/DL (ref 0.3–1.2)
BUN BLDV-MCNC: 16 MG/DL (ref 7–22)
CALCIUM SERPL-MCNC: 8.8 MG/DL (ref 8.5–10.5)
CHLORIDE BLD-SCNC: 105 MEQ/L (ref 98–111)
CHOLESTEROL, FASTING: 124 MG/DL (ref 100–199)
CO2: 25 MEQ/L (ref 23–33)
CREAT SERPL-MCNC: 0.6 MG/DL (ref 0.4–1.2)
EOSINOPHIL # BLD: 3 %
EOSINOPHILS ABSOLUTE: 0.2 THOU/MM3 (ref 0–0.4)
ERYTHROCYTE [DISTWIDTH] IN BLOOD BY AUTOMATED COUNT: 14.8 % (ref 11.5–14.5)
ERYTHROCYTE [DISTWIDTH] IN BLOOD BY AUTOMATED COUNT: 47.2 FL (ref 35–45)
FERRITIN: 63 NG/ML (ref 10–291)
GFR SERPL CREATININE-BSD FRML MDRD: > 90 ML/MIN/1.73M2
GLUCOSE FASTING: 109 MG/DL (ref 70–108)
HCT VFR BLD CALC: 38.9 % (ref 37–47)
HDLC SERPL-MCNC: 69 MG/DL
HEMOGLOBIN: 13.1 GM/DL (ref 12–16)
IMMATURE GRANS (ABS): 0.03 THOU/MM3 (ref 0–0.07)
IMMATURE GRANULOCYTES: 0.4 %
IRON: 63 UG/DL (ref 50–170)
LDL CHOLESTEROL CALCULATED: 50 MG/DL
LYMPHOCYTES # BLD: 26.3 %
LYMPHOCYTES ABSOLUTE: 2 THOU/MM3 (ref 1–4.8)
MCH RBC QN AUTO: 29.6 PG (ref 26–33)
MCHC RBC AUTO-ENTMCNC: 33.7 GM/DL (ref 32.2–35.5)
MCV RBC AUTO: 87.8 FL (ref 81–99)
MONOCYTES # BLD: 8 %
MONOCYTES ABSOLUTE: 0.6 THOU/MM3 (ref 0.4–1.3)
NUCLEATED RED BLOOD CELLS: 0 /100 WBC
PLATELET # BLD: 352 THOU/MM3 (ref 130–400)
PMV BLD AUTO: 9.5 FL (ref 9.4–12.4)
POTASSIUM SERPL-SCNC: 4.1 MEQ/L (ref 3.5–5.2)
RBC # BLD: 4.43 MILL/MM3 (ref 4.2–5.4)
SEG NEUTROPHILS: 61.9 %
SEGMENTED NEUTROPHILS ABSOLUTE COUNT: 4.7 THOU/MM3 (ref 1.8–7.7)
SODIUM BLD-SCNC: 140 MEQ/L (ref 135–145)
TOTAL PROTEIN: 6.8 G/DL (ref 6.1–8)
TRIGLYCERIDE, FASTING: 24 MG/DL (ref 0–199)
WBC # BLD: 7.6 THOU/MM3 (ref 4.8–10.8)

## 2021-02-18 PROCEDURE — 85025 COMPLETE CBC W/AUTO DIFF WBC: CPT

## 2021-02-18 PROCEDURE — 80061 LIPID PANEL: CPT

## 2021-02-18 PROCEDURE — 80053 COMPREHEN METABOLIC PANEL: CPT

## 2021-02-18 PROCEDURE — 36415 COLL VENOUS BLD VENIPUNCTURE: CPT

## 2021-02-18 PROCEDURE — 82728 ASSAY OF FERRITIN: CPT

## 2021-02-18 PROCEDURE — 83540 ASSAY OF IRON: CPT

## 2021-02-22 ENCOUNTER — VIRTUAL VISIT (OUTPATIENT)
Dept: INTERNAL MEDICINE CLINIC | Age: 51
End: 2021-02-22
Payer: COMMERCIAL

## 2021-02-22 DIAGNOSIS — U07.1 COVID-19: Primary | ICD-10-CM

## 2021-02-22 DIAGNOSIS — D50.9 IRON DEFICIENCY ANEMIA, UNSPECIFIED IRON DEFICIENCY ANEMIA TYPE: ICD-10-CM

## 2021-02-22 DIAGNOSIS — R06.02 SHORTNESS OF BREATH: ICD-10-CM

## 2021-02-22 DIAGNOSIS — R05.9 COUGH: ICD-10-CM

## 2021-02-22 PROCEDURE — 99214 OFFICE O/P EST MOD 30 MIN: CPT | Performed by: NURSE PRACTITIONER

## 2021-02-22 ASSESSMENT — ENCOUNTER SYMPTOMS
CHEST TIGHTNESS: 1
SINUS PAIN: 1
NAUSEA: 0
DIARRHEA: 0
DIARRHEA: 0
TROUBLE SWALLOWING: 0
VOMITING: 0
CHEST TIGHTNESS: 1
SINUS PRESSURE: 1
ABDOMINAL PAIN: 0
RHINORRHEA: 1
SORE THROAT: 0
VOMITING: 0
SHORTNESS OF BREATH: 0
TROUBLE SWALLOWING: 0
SORE THROAT: 0
COUGH: 1
ABDOMINAL PAIN: 0
NAUSEA: 0
SHORTNESS OF BREATH: 0
COUGH: 1

## 2021-02-22 NOTE — PROGRESS NOTES
2021    TELEHEALTH EVALUATION -- Audio/Visual (During [place]OK[place]-13 public health emergency)    HPI:    Claudette Saunders (:  1970) has requested an audio/video evaluation for the following concern(s):    Chief Complaint   Patient presents with    Cough     Recent COVID infection, RTW    Anemia       Here for follow up post COVID follow up. She is feeling much improved. Occasional cough, improving SOB. No sputum, denies fever, chills. Going back to work tomorrow. Has not needed any additional medications including decongestants. Using inhaler once daily. No menses in 3 months. Last GYN appt over year ago. Review of Systems   Constitutional: Negative for chills, fatigue and fever. HENT: Negative for sore throat. Respiratory: Positive for cough (Occasional, improving) and shortness of breath (Occasional, improving). Cardiovascular: Negative for chest pain and leg swelling. Gastrointestinal: Negative for abdominal pain, diarrhea, nausea and vomiting. Genitourinary: Negative for difficulty urinating and dysuria. Musculoskeletal: Negative for arthralgias and myalgias. Skin: Negative for rash and wound. Neurological: Negative for dizziness, weakness, light-headedness and headaches. Prior to Visit Medications    Medication Sig Taking?  Authorizing Provider   Dextromethorphan-guaiFENesin (MUCINEX DM MAXIMUM STRENGTH)  MG TB12 Take 1 tablet by mouth 2 times daily  Historical Provider, MD   pseudoephedrine (SUDAFED) 30 MG tablet Take 30 mg by mouth every 4 hours as needed for Congestion  Historical Provider, MD   albuterol sulfate HFA (VENTOLIN HFA) 108 (90 Base) MCG/ACT inhaler Inhale 2 puffs into the lungs 4 times daily as needed for Wheezing  EMI Obrien CNP   ferrous sulfate 325 (65 Fe) MG tablet Take 1 tablet by mouth 2 times daily  EMI Obrien CNP   omeprazole (PRILOSEC) 20 MG delayed release capsule Take 1 capsule by mouth every morning (before breakfast)  EMI Leone CNP   loratadine (CLARITIN) 10 MG capsule Take 10 mg by mouth daily as needed  Historical Provider, MD   ibuprofen (ADVIL;MOTRIN) 200 MG tablet Take 200 mg by mouth every 6 hours as needed for Pain  Historical Provider, MD   fluticasone (FLONASE) 50 MCG/ACT nasal spray 2 sprays by Each Nare route daily  EMI Leone CNP       Social History     Tobacco Use    Smoking status: Never Smoker    Smokeless tobacco: Never Used   Substance Use Topics    Alcohol use: No    Drug use: No        No Known Allergies    PHYSICAL EXAMINATION:  [ INSTRUCTIONS:  \"[x]\" Indicates a positive item  \"[]\" Indicates a negative item  -- DELETE ALL ITEMS NOT EXAMINED]  Vital Signs: (As obtained by patient/caregiver or practitioner observation)    N/A     Constitutional: [x] Appears well-developed and well-nourished [x] No apparent distress      [] Abnormal-   Mental status  [x] Alert and awake  [] Oriented to person/place/time []Able to follow commands      Eyes:  EOM    []  Normal  [] Abnormal-  Sclera  [x]  Normal  [] Abnormal -         Discharge []  None visible  [] Abnormal -    HENT:   [x] Normocephalic, atraumatic.   [] Abnormal   [x] Mouth/Throat: Mucous membranes are moist.     External Ears [] Normal  [] Abnormal-     Neck: [] No visualized mass     Pulmonary/Chest: [x] Respiratory effort normal.  [x] No visualized signs of difficulty breathing or respiratory distress        [] Abnormal-      Musculoskeletal:   [] Normal gait with no signs of ataxia         [] Normal range of motion of neck        [] Abnormal-       Neurological:        [x] No Facial Asymmetry (Cranial nerve 7 motor function) (limited exam to video visit)          [] No gaze palsy        [] Abnormal-         Skin:        [x] No significant exanthematous lesions or discoloration noted on facial skin         [] Abnormal-            Psychiatric:       [x] Normal Affect [] No Hallucinations        [] Abnormal-     Other pertinent observable physical exam findings-     ASSESSMENT/PLAN:  1. COVID-19  Onset 4 weeks ago. Currently her symptoms are improving steadily. She feels ready to go back to work. She has had no fevers. Stop supplements, as these have altered her taste. Advised will provide return to work slip. Suggested a reduced schedule, she will go back and see how she feels. Any problems call. 2. Cough  Improving. 3. Shortness of breath  Improving activity tolerance, using inhaler only once daily now. I advised her that she should likely premedicate with her inhaler prior to work and at lunchtime    4. Iron deficiency anemia, unspecified iron deficiency anemia type  Fatigued, historically she has had some anemia. Last hemoglobin 13.1. Iron stores are stable. Take once daily iron. Recheck ferritin, iron, CBC in 3 months. See GYN for amenorrhea x 3 months and hot flashes.     - CBC With Auto Differential; Future  - Ferritin; Future  - Iron; Future      Return in about 3 months (around 5/22/2021). Giancarlo Orosco is a 53 y.o. female being evaluated by a Virtual Visit (video visit) encounter to address concerns as mentioned above. A caregiver was present when appropriate. Due to this being a TeleHealth encounter (During [place] [address] public health emergency), evaluation of the following organ systems was limited: Vitals/Constitutional/EENT/Resp/CV/GI//MS/Neuro/Skin/Heme-Lymph-Imm. Pursuant to the emergency declaration under the [facility], [address] [place] [address] and the [employer] Act, this Virtual Visit was conducted with patient's (and/or legal guardian's) consent, to reduce the patient's risk of exposure to COVID-19 and provide necessary medical care.   The patient (and/or legal guardian) has also been advised to contact this office for worsening conditions or problems, and seek emergency medical treatment and/or call 911 if deemed necessary. Patient identification was verified at the start of the visit: Yes    Total time spent on this encounter: Not billed by time    Services were provided through a video synchronous discussion virtually to substitute for in-person clinic visit. Patient was located at their individual home and provider was located at the office. --EMI Clement CNP on 3/12/2021 at 9:32 AM    An electronic signature was used to authenticate this note.

## 2021-03-12 ASSESSMENT — ENCOUNTER SYMPTOMS
NAUSEA: 0
COUGH: 1
DIARRHEA: 0
SORE THROAT: 0
ABDOMINAL PAIN: 0
VOMITING: 0
SHORTNESS OF BREATH: 1

## 2021-03-25 ENCOUNTER — APPOINTMENT (OUTPATIENT)
Dept: CT IMAGING | Age: 51
End: 2021-03-25
Payer: COMMERCIAL

## 2021-03-25 ENCOUNTER — APPOINTMENT (OUTPATIENT)
Dept: GENERAL RADIOLOGY | Age: 51
End: 2021-03-25
Payer: COMMERCIAL

## 2021-03-25 ENCOUNTER — HOSPITAL ENCOUNTER (EMERGENCY)
Age: 51
Discharge: HOME | End: 2021-03-25
Attending: EMERGENCY MEDICINE
Payer: COMMERCIAL

## 2021-03-25 VITALS
WEIGHT: 250 LBS | OXYGEN SATURATION: 96 % | DIASTOLIC BLOOD PRESSURE: 66 MMHG | SYSTOLIC BLOOD PRESSURE: 129 MMHG | RESPIRATION RATE: 16 BRPM | TEMPERATURE: 98.7 F | HEART RATE: 74 BPM | BODY MASS INDEX: 42.68 KG/M2 | HEIGHT: 64 IN

## 2021-03-25 DIAGNOSIS — R07.9 CHEST PAIN, UNSPECIFIED TYPE: ICD-10-CM

## 2021-03-25 DIAGNOSIS — R42 ORTHOSTATIC DIZZINESS: Primary | ICD-10-CM

## 2021-03-25 LAB
ALBUMIN SERPL-MCNC: 4 G/DL (ref 3.5–5.1)
ALBUMIN SERPL-MCNC: 4 G/DL (ref 3.5–5.1)
ALP BLD-CCNC: 79 U/L (ref 38–126)
ALP BLD-CCNC: 84 U/L (ref 38–126)
ALT SERPL-CCNC: 24 U/L (ref 11–66)
ALT SERPL-CCNC: 24 U/L (ref 11–66)
ANION GAP SERPL CALCULATED.3IONS-SCNC: 9 MEQ/L (ref 8–16)
AST SERPL-CCNC: 21 U/L (ref 5–40)
AST SERPL-CCNC: 22 U/L (ref 5–40)
BASOPHILS # BLD: 0.1 %
BASOPHILS ABSOLUTE: 0 THOU/MM3 (ref 0–0.1)
BILIRUB SERPL-MCNC: 0.7 MG/DL (ref 0.3–1.2)
BILIRUB SERPL-MCNC: 0.7 MG/DL (ref 0.3–1.2)
BILIRUBIN DIRECT: < 0.2 MG/DL (ref 0–0.3)
BUN BLDV-MCNC: 17 MG/DL (ref 7–22)
CALCIUM SERPL-MCNC: 8.6 MG/DL (ref 8.5–10.5)
CHLORIDE BLD-SCNC: 105 MEQ/L (ref 98–111)
CO2: 24 MEQ/L (ref 23–33)
CREAT SERPL-MCNC: 0.6 MG/DL (ref 0.4–1.2)
D-DIMER QUANTITATIVE: 584 NG/ML FEU (ref 0–500)
EKG ATRIAL RATE: 86 BPM
EKG P AXIS: 30 DEGREES
EKG P-R INTERVAL: 180 MS
EKG Q-T INTERVAL: 368 MS
EKG QRS DURATION: 96 MS
EKG QTC CALCULATION (BAZETT): 440 MS
EKG R AXIS: 32 DEGREES
EKG T AXIS: 34 DEGREES
EKG VENTRICULAR RATE: 86 BPM
EOSINOPHIL # BLD: 0.4 %
EOSINOPHILS ABSOLUTE: 0 THOU/MM3 (ref 0–0.4)
ERYTHROCYTE [DISTWIDTH] IN BLOOD BY AUTOMATED COUNT: 14.4 % (ref 11.5–14.5)
ERYTHROCYTE [DISTWIDTH] IN BLOOD BY AUTOMATED COUNT: 47.7 FL (ref 35–45)
GFR SERPL CREATININE-BSD FRML MDRD: > 90 ML/MIN/1.73M2
GLUCOSE BLD-MCNC: 110 MG/DL (ref 70–108)
HCT VFR BLD CALC: 39.5 % (ref 37–47)
HEMOGLOBIN: 12.9 GM/DL (ref 12–16)
IMMATURE GRANS (ABS): 0.02 THOU/MM3 (ref 0–0.07)
IMMATURE GRANULOCYTES: 0.3 %
LIPASE: 18.1 U/L (ref 5.6–51.3)
LYMPHOCYTES # BLD: 11.4 %
LYMPHOCYTES ABSOLUTE: 0.8 THOU/MM3 (ref 1–4.8)
MCH RBC QN AUTO: 29.7 PG (ref 26–33)
MCHC RBC AUTO-ENTMCNC: 32.7 GM/DL (ref 32.2–35.5)
MCV RBC AUTO: 90.8 FL (ref 81–99)
MONOCYTES # BLD: 7 %
MONOCYTES ABSOLUTE: 0.5 THOU/MM3 (ref 0.4–1.3)
NUCLEATED RED BLOOD CELLS: 0 /100 WBC
OSMOLALITY CALCULATION: 277.9 MOSMOL/KG (ref 275–300)
PLATELET # BLD: 285 THOU/MM3 (ref 130–400)
PMV BLD AUTO: 9.7 FL (ref 9.4–12.4)
POTASSIUM REFLEX MAGNESIUM: 3.9 MEQ/L (ref 3.5–5.2)
PRO-BNP: 88.2 PG/ML (ref 0–450)
RBC # BLD: 4.35 MILL/MM3 (ref 4.2–5.4)
SEG NEUTROPHILS: 80.8 %
SEGMENTED NEUTROPHILS ABSOLUTE COUNT: 5.9 THOU/MM3 (ref 1.8–7.7)
SODIUM BLD-SCNC: 138 MEQ/L (ref 135–145)
TOTAL PROTEIN: 6.9 G/DL (ref 6.1–8)
TOTAL PROTEIN: 7.1 G/DL (ref 6.1–8)
TROPONIN T: < 0.01 NG/ML
WBC # BLD: 7.3 THOU/MM3 (ref 4.8–10.8)

## 2021-03-25 PROCEDURE — 84484 ASSAY OF TROPONIN QUANT: CPT

## 2021-03-25 PROCEDURE — 71275 CT ANGIOGRAPHY CHEST: CPT

## 2021-03-25 PROCEDURE — 83690 ASSAY OF LIPASE: CPT

## 2021-03-25 PROCEDURE — 36415 COLL VENOUS BLD VENIPUNCTURE: CPT

## 2021-03-25 PROCEDURE — 6360000004 HC RX CONTRAST MEDICATION: Performed by: EMERGENCY MEDICINE

## 2021-03-25 PROCEDURE — 85025 COMPLETE CBC W/AUTO DIFF WBC: CPT

## 2021-03-25 PROCEDURE — 96361 HYDRATE IV INFUSION ADD-ON: CPT

## 2021-03-25 PROCEDURE — 2580000003 HC RX 258: Performed by: STUDENT IN AN ORGANIZED HEALTH CARE EDUCATION/TRAINING PROGRAM

## 2021-03-25 PROCEDURE — 83880 ASSAY OF NATRIURETIC PEPTIDE: CPT

## 2021-03-25 PROCEDURE — 70450 CT HEAD/BRAIN W/O DYE: CPT

## 2021-03-25 PROCEDURE — 96374 THER/PROPH/DIAG INJ IV PUSH: CPT

## 2021-03-25 PROCEDURE — 6360000002 HC RX W HCPCS: Performed by: STUDENT IN AN ORGANIZED HEALTH CARE EDUCATION/TRAINING PROGRAM

## 2021-03-25 PROCEDURE — 85379 FIBRIN DEGRADATION QUANT: CPT

## 2021-03-25 PROCEDURE — 99284 EMERGENCY DEPT VISIT MOD MDM: CPT

## 2021-03-25 PROCEDURE — 93005 ELECTROCARDIOGRAM TRACING: CPT | Performed by: STUDENT IN AN ORGANIZED HEALTH CARE EDUCATION/TRAINING PROGRAM

## 2021-03-25 PROCEDURE — 96375 TX/PRO/DX INJ NEW DRUG ADDON: CPT

## 2021-03-25 PROCEDURE — 80053 COMPREHEN METABOLIC PANEL: CPT

## 2021-03-25 PROCEDURE — 71046 X-RAY EXAM CHEST 2 VIEWS: CPT

## 2021-03-25 RX ORDER — 0.9 % SODIUM CHLORIDE 0.9 %
1000 INTRAVENOUS SOLUTION INTRAVENOUS ONCE
Status: COMPLETED | OUTPATIENT
Start: 2021-03-25 | End: 2021-03-25

## 2021-03-25 RX ORDER — PROCHLORPERAZINE EDISYLATE 5 MG/ML
10 INJECTION INTRAMUSCULAR; INTRAVENOUS ONCE
Status: COMPLETED | OUTPATIENT
Start: 2021-03-25 | End: 2021-03-25

## 2021-03-25 RX ORDER — PROMETHAZINE HYDROCHLORIDE 25 MG/1
25 TABLET ORAL 3 TIMES DAILY PRN
Qty: 12 TABLET | Refills: 0 | Status: SHIPPED | OUTPATIENT
Start: 2021-03-25 | End: 2021-04-01

## 2021-03-25 RX ORDER — KETOROLAC TROMETHAMINE 30 MG/ML
30 INJECTION, SOLUTION INTRAMUSCULAR; INTRAVENOUS ONCE
Status: COMPLETED | OUTPATIENT
Start: 2021-03-25 | End: 2021-03-25

## 2021-03-25 RX ADMIN — SODIUM CHLORIDE 1000 ML: 9 INJECTION, SOLUTION INTRAVENOUS at 07:49

## 2021-03-25 RX ADMIN — KETOROLAC TROMETHAMINE 30 MG: 30 INJECTION, SOLUTION INTRAMUSCULAR at 08:38

## 2021-03-25 RX ADMIN — IOPAMIDOL 80 ML: 755 INJECTION, SOLUTION INTRAVENOUS at 09:28

## 2021-03-25 RX ADMIN — PROCHLORPERAZINE EDISYLATE 10 MG: 5 INJECTION INTRAMUSCULAR; INTRAVENOUS at 08:38

## 2021-03-25 ASSESSMENT — ENCOUNTER SYMPTOMS
TROUBLE SWALLOWING: 0
ABDOMINAL PAIN: 0
VOICE CHANGE: 0
BACK PAIN: 1
SINUS PAIN: 0
PHOTOPHOBIA: 0
WHEEZING: 0
SHORTNESS OF BREATH: 0
ABDOMINAL DISTENTION: 0
COUGH: 0
BLOOD IN STOOL: 0
NAUSEA: 0
SINUS PRESSURE: 0
CHOKING: 0
DIARRHEA: 0
CONSTIPATION: 0
RHINORRHEA: 0
SORE THROAT: 0
VOMITING: 0
RECTAL PAIN: 0
CHEST TIGHTNESS: 0
ANAL BLEEDING: 0
STRIDOR: 0

## 2021-03-25 ASSESSMENT — HEART SCORE: ECG: 0

## 2021-03-25 ASSESSMENT — PAIN DESCRIPTION - PAIN TYPE: TYPE: ACUTE PAIN

## 2021-03-25 ASSESSMENT — PAIN SCALES - GENERAL
PAINLEVEL_OUTOF10: 8
PAINLEVEL_OUTOF10: 2

## 2021-03-25 ASSESSMENT — PAIN DESCRIPTION - FREQUENCY: FREQUENCY: CONTINUOUS

## 2021-03-25 NOTE — ED PROVIDER NOTES
[facility] ENCOUNTER          Pt Name: Chino Molina  MRN: [id number]  [place] 1970  Date of evaluation: 3/25/2021  Treating Resident Physician: Peterson Gibson MD  Supervising Physician: Alf Irvin       Chief Complaint   Patient presents with    Dizziness    Blurred Vision    Nausea    Bloated     History obtained from the patient. HISTORY OF PRESENT ILLNESS    HPI  Chino Molina is a 53 y.o. female who presents to the emergency department for evaluation of dizziness, exertional dyspnea, headache. Patient states that she had Covid about a month ago, never felt like she completely recovered. Patient states that she was still having exertional dyspnea and episodes of tachycardia. Patient states that over the past 2 days she has had increasing dizziness, headache, frontal, tightness, 7 out of 10. Patient has had mild nausea as well, worsening when she is standing up, as well as the dizziness worsening when she stands up. Patient says she has been alternating between feeling too hot and too cold. Having some chest pain a couple days ago, resolved at this time. No leg swelling. The patient has no other acute complaints at this time. REVIEW OF SYSTEMS   Review of Systems   Constitutional: Positive for chills and fatigue. Negative for activity change, appetite change, diaphoresis and fever. HENT: Negative for mouth sores, nosebleeds, postnasal drip, rhinorrhea, sinus pressure, sinus pain, sneezing, sore throat, trouble swallowing and voice change. Eyes: Negative for photophobia and visual disturbance. Respiratory: Negative for cough, choking, chest tightness, shortness of breath, wheezing and stridor. Cardiovascular: Negative for chest pain, palpitations and leg swelling.    Gastrointestinal: Negative for abdominal distention, abdominal pain, anal bleeding, blood in stool, constipation, diarrhea, nausea, rectal pain and vomiting. Genitourinary: Negative for decreased urine volume, difficulty urinating, dysuria, enuresis, flank pain, frequency, hematuria and urgency. Musculoskeletal: Positive for arthralgias, back pain and myalgias. Neurological: Positive for light-headedness and headaches. Negative for tremors, seizures, syncope, speech difficulty, weakness and numbness. PAST MEDICAL AND SURGICAL HISTORY     Past Medical History:   Diagnosis Date    Gall stones      Past Surgical History:   Procedure Laterality Date    NASAL FRACTURE SURGERY      TUBAL LIGATION           MEDICATIONS   No current facility-administered medications for this encounter.      Current Outpatient Medications:     promethazine (PHENERGAN) 25 MG tablet, Take 1 tablet by mouth 3 times daily as needed for Nausea, Disp: 12 tablet, Rfl: 0    Dextromethorphan-guaiFENesin (MUCINEX DM MAXIMUM STRENGTH)  MG TB12, Take 1 tablet by mouth 2 times daily, Disp: , Rfl:     pseudoephedrine (SUDAFED) 30 MG tablet, Take 30 mg by mouth every 4 hours as needed for Congestion, Disp: , Rfl:     albuterol sulfate HFA (VENTOLIN HFA) 108 (90 Base) MCG/ACT inhaler, Inhale 2 puffs into the lungs 4 times daily as needed for Wheezing, Disp: 1 Inhaler, Rfl: 0    ferrous sulfate 325 (65 Fe) MG tablet, Take 1 tablet by mouth 2 times daily, Disp: 30 tablet, Rfl: 3    omeprazole (PRILOSEC) 20 MG delayed release capsule, Take 1 capsule by mouth every morning (before breakfast), Disp: 30 capsule, Rfl: 5    loratadine (CLARITIN) 10 MG capsule, Take 10 mg by mouth daily as needed, Disp: , Rfl:     ibuprofen (ADVIL;MOTRIN) 200 MG tablet, Take 200 mg by mouth every 6 hours as needed for Pain, Disp: , Rfl:     fluticasone (FLONASE) 50 MCG/ACT nasal spray, 2 sprays by Each Nare route daily, Disp: 1 Bottle, Rfl: 0      SOCIAL HISTORY     Social History     Social History Narrative    Not on file     Social History     Tobacco Use    Smoking status: Never Smoker    Smokeless tobacco: Never Used   Substance Use Topics    Alcohol use: No    Drug use: No         ALLERGIES   No Known Allergies      FAMILY HISTORY     Family History   Problem Relation Age of Onset    Heart Disease Mother     Diabetes Maternal Grandfather     Heart Disease Paternal Grandfather     Dementia Paternal Grandfather     Diabetes Paternal Grandfather     Diabetes Maternal Grandmother          PREVIOUS RECORDS   Previous records reviewed: Medical, past surgical, medications, allergies. PHYSICAL EXAM     ED Triage Vitals [03/25/21 0713]   BP Temp Temp Source Pulse Resp SpO2 Height Weight   (!) 149/83 98.7 °F (37.1 °C) Oral 97 22 96 % 5' 4\" (1.626 m) 250 lb (113.4 kg)     Initial vital signs and nursing assessment reviewed and Mildly hypertensive. Body mass index is 42.91 kg/m². Pulsoximetry is normal per my interpretation. Additional Vital Signs:  Vitals:    03/25/21 1114   BP:    Pulse: 74   Resp: 16   Temp:    SpO2: 96%       Physical Exam  Constitutional:       Appearance: She is ill-appearing. HENT:      Head: Normocephalic and atraumatic. Right Ear: External ear normal.      Left Ear: External ear normal.      Nose: Nose normal.      Mouth/Throat:      Mouth: Mucous membranes are moist.      Pharynx: Oropharynx is clear. Eyes:      Extraocular Movements: Extraocular movements intact. Conjunctiva/sclera: Conjunctivae normal.      Pupils: Pupils are equal, round, and reactive to light. Neck:      Musculoskeletal: Normal range of motion and neck supple. Cardiovascular:      Rate and Rhythm: Normal rate and regular rhythm. Pulses: Normal pulses. Heart sounds: Normal heart sounds. No murmur. No gallop. Comments: Orthostatic for hR    Pulmonary:      Effort: Pulmonary effort is normal. No respiratory distress. Breath sounds: Normal breath sounds. No wheezing or rales. Chest:      Chest wall: No tenderness.    Abdominal: General: Abdomen is flat. Bowel sounds are normal. There is no distension. Palpations: Abdomen is soft. Tenderness: There is no abdominal tenderness. There is no right CVA tenderness, left CVA tenderness or guarding. Musculoskeletal:         General: No deformity or signs of injury. Right lower leg: No edema. Left lower leg: No edema. Comments: Increased tonicity of right paraspinal at level of L2   Skin:     General: Skin is warm and dry. Capillary Refill: Capillary refill takes less than 2 seconds. Coloration: Skin is not jaundiced or pale. Findings: No bruising, erythema, lesion or rash. Neurological:      General: No focal deficit present. Mental Status: She is alert and oriented to person, place, and time. Sensory: No sensory deficit. Motor: No weakness. MEDICAL DECISION MAKING   Initial Assessment:   3 28-year-old female, past medical history of GERD, presenting with lightheadedness, headache, exertional dyspnea. Physical exam significant for positive orthostatics for heart rate, ill-appearing, dry mucous membranes. Differential diagnoses include but are not limited to dehydration, CHF, PE, pneumonia, UTI. Plan:    IV fluids, EKG, pain and nausea treatment, CBC, CMP, dimer, troponin, BNP, chest x-ray   Dimer elevated, will get CT chest to rule out PE   CT chest negative   Discharge to home with follow-up to cardiology for exertional dyspnea. Follow-up to neurology for dizziness and possible meningioma seen on head CT   Strict return precautions discussed with patient.         ED RESULTS   Laboratory results:  Labs Reviewed   CBC WITH AUTO DIFFERENTIAL - Abnormal; Notable for the following components:       Result Value    RDW-SD 47.7 (*)     Lymphocytes Absolute 0.8 (*)     All other components within normal limits   COMPREHENSIVE METABOLIC PANEL W/ REFLEX TO MG FOR LOW K - Abnormal; Notable for the following components:    Glucose 110 (*)     All other components within normal limits   D-DIMER, QUANTITATIVE - Abnormal; Notable for the following components:    D-Dimer, Quant 584.00 (*)     All other components within normal limits   LIPASE   HEPATIC FUNCTION PANEL   TROPONIN   BRAIN NATRIURETIC PEPTIDE   ANION GAP   OSMOLALITY   GLOMERULAR FILTRATION RATE, ESTIMATED       Radiologic studies results:  CT Head WO Contrast   Final Result   1.5 x 0.9 cm extra-axial mass high right parietal lobe likely a meningioma. No acute intracranial process otherwise. **This report has been created using voice recognition software. It may contain minor errors which are inherent in voice recognition technology. **      Final report electronically signed by Dr. Van Collier on 3/25/2021 10:51 AM      CTA CHEST W WO CONTRAST - r/o Pulmonary Embolism   Final Result   No PE. No acute findings. Cholelithiasis. **This report has been created using voice recognition software. It may contain minor errors which are inherent in voice recognition technology. **      Final report electronically signed by Dr. Van Collier on 3/25/2021 9:45 AM      XR CHEST (2 VW)   Final Result      No acute intrathoracic process. **This report has been created using voice recognition software. It may contain minor errors which are inherent in voice recognition technology. **      Final report electronically signed by Dr. Marlyn Mcfarland on 3/25/2021 8:25 AM          ED Medications administered this visit:   Medications   0.9 % sodium chloride bolus (0 mLs Intravenous Stopped 3/25/21 1014)   ketorolac (TORADOL) injection 30 mg (30 mg Intravenous Given 3/25/21 0838)   prochlorperazine (COMPAZINE) injection 10 mg (10 mg Intravenous Given 3/25/21 0838)   iopamidol (ISOVUE-370) 76 % injection 80 mL (80 mLs Intravenous Given 3/25/21 0928)         ED COURSE     ED Course as of Mar 25 2047   Thu Mar 25, 2021   0957 Reassessed, symptoms completely resolved.   She ambulates well, is not ataxic, clinically looks well. Right TM has fluid    [AB]      ED Course User Index  [AB] Atlee Terrance, DO       Strict return precautions and follow up instructions were discussed with the patient prior to discharge, with which the patient agrees. MEDICATION CHANGES     Discharge Medication List as of 3/25/2021 11:48 AM      START taking these medications    Details   promethazine (PHENERGAN) 25 MG tablet Take 1 tablet by mouth 3 times daily as needed for Nausea, Disp-12 tablet, R-0Print               FINAL DISPOSITION     Final diagnoses:   Orthostatic dizziness   Chest pain, unspecified type     Condition: condition: good  Dispo: Discharge to home      This transcription was electronically signed. Parts of this transcriptions may have been dictated by use of voice recognition software and electronically transcribed, and parts may have been transcribed with the assistance of an ED scribe. The transcription may contain errors not detected in proofreading. Please refer to my supervising physician's documentation if my documentation differs.     Electronically Signed: Travis Schmitz, 03/25/21, 8:47 PM         Travis Schmitz MD  Resident  03/25/21 5081

## 2021-03-25 NOTE — LETTER
[facility]   [place], [address]          PROOF OF PRESENCE      To Whom It May Concern:    Barbara Miguel was present in the Emergency Department at [facility] on 3/25/21.                                      Sincerely,

## 2021-03-25 NOTE — ED NOTES
Patient medicated per MAR. IV fluids continue to infuse at this time. Patient updated on POC, verbalized understanding.  Patient resting comfortably on cot, respirations easy and unlabored      Lyle Jackson RN  03/25/21 5126

## 2021-03-26 ENCOUNTER — OFFICE VISIT (OUTPATIENT)
Dept: CARDIOLOGY CLINIC | Age: 51
End: 2021-03-26
Payer: COMMERCIAL

## 2021-03-26 VITALS
DIASTOLIC BLOOD PRESSURE: 78 MMHG | WEIGHT: 243.1 LBS | SYSTOLIC BLOOD PRESSURE: 120 MMHG | BODY MASS INDEX: 41.5 KG/M2 | HEIGHT: 64 IN | HEART RATE: 66 BPM

## 2021-03-26 DIAGNOSIS — R06.00 DYSPNEA, UNSPECIFIED TYPE: Primary | ICD-10-CM

## 2021-03-26 PROCEDURE — 99204 OFFICE O/P NEW MOD 45 MIN: CPT | Performed by: INTERNAL MEDICINE

## 2021-03-26 NOTE — PROGRESS NOTES
[id number] [facility] [address]  [place] [address]  Dept: [phone]  Dept Fax: [phone]  Loc: [phone]    Visit Date: 3/26/2021    Ms. Jeremy Capps is a 53 y.o. female  who presented for:  Chief Complaint   Patient presents with    Follow-up     ED FU        HPI:   52 yo F c hx of recent COVID19 is here for an evaluation after being int  ED. She was in the ED yesterday for dizziness, lightheadedness, fatigue and nausea. She use to see Dr. Graciela Westbrook in the past.  She had stress and echo which was apparently normal.  Yesterday EKG shows SR, no ST-T changes. She states she physically cannot do too much since [address]. Did report some chest pains and shortness of breath while in the ED.           Current Outpatient Medications:     promethazine (PHENERGAN) 25 MG tablet, Take 1 tablet by mouth 3 times daily as needed for Nausea, Disp: 12 tablet, Rfl: 0    Dextromethorphan-guaiFENesin (MUCINEX DM MAXIMUM STRENGTH)  MG TB12, Take 1 tablet by mouth 2 times daily, Disp: , Rfl:     pseudoephedrine (SUDAFED) 30 MG tablet, Take 30 mg by mouth every 4 hours as needed for Congestion, Disp: , Rfl:     albuterol sulfate HFA (VENTOLIN HFA) 108 (90 Base) MCG/ACT inhaler, Inhale 2 puffs into the lungs 4 times daily as needed for Wheezing, Disp: 1 Inhaler, Rfl: 0    ferrous sulfate 325 (65 Fe) MG tablet, Take 1 tablet by mouth 2 times daily, Disp: 30 tablet, Rfl: 3    omeprazole (PRILOSEC) 20 MG delayed release capsule, Take 1 capsule by mouth every morning (before breakfast), Disp: 30 capsule, Rfl: 5    loratadine (CLARITIN) 10 MG capsule, Take 10 mg by mouth daily as needed, Disp: , Rfl:     ibuprofen (ADVIL;MOTRIN) 200 MG tablet, Take 200 mg by mouth every 6 hours as needed for Pain, Disp: , Rfl:     fluticasone (FLONASE) 50 MCG/ACT nasal spray, 2 sprays by Each Nare route daily, Disp: 1 Bottle, Rfl: 0    Past Medical History  Hardik Frank  has a past medical history of Gall stones. Social History  Kellie Quintana  reports that she has never smoked. She has never used smokeless tobacco. She reports that she does not drink alcohol or use drugs. Family History  Kellie Quintaan family history includes Dementia in her paternal grandfather; Diabetes in her maternal grandfather, maternal grandmother, and paternal grandfather; Heart Disease in her mother and paternal grandfather. Past Surgical History   Past Surgical History:   Procedure Laterality Date    NASAL FRACTURE SURGERY      TUBAL LIGATION         Subjective:     REVIEW OF SYSTEMS  Constitutional: denies sweats, chills and fever  HENT: denies  congestion, sinus pressure, sneezing and sore throat. Eyes: denies  pain, discharge, redness and itching. Respiratory: denies apnea, cough  Gastrointestinal: denies blood in stool, constipation, diarrhea   Endocrine: denies cold intolerance, heat intolerance, polydipsia. Genitourinary: denies dysuria, enuresis, flank pain and hematuria. Musculoskeletal: denies arthralgias, joint swelling and neck pain. Neurological: denies numbness and headaches. Psychiatric/Behavioral: denies agitation, confusion, decreased concentration and dysphoric mood    All others reviewed and are negative. Objective:     /78   Pulse 66   Ht 5' 4\" (1.626 m)   Wt 243 lb 1.6 oz (110.3 kg)   BMI 41.73 kg/m²     Wt Readings from Last 3 Encounters:   03/26/21 243 lb 1.6 oz (110.3 kg)   03/25/21 250 lb (113.4 kg)   02/15/21 242 lb (109.8 kg)     BP Readings from Last 3 Encounters:   03/26/21 120/78   03/25/21 129/66   02/15/21 (!) 147/68       PHYSICAL EXAM  Constitutional: Oriented to person, place, and time. Appears well-developed and well-nourished. HENT:   Head: Normocephalic and atraumatic. Eyes: EOM are normal. Pupils are equal, round, and reactive to light. Neck: Normal range of motion. Neck supple. No JVD present.    Cardiovascular: Normal rate , normal heart sounds and M-Mode/ Colorflow/ Do       STRESS:    CATH:    Assessment/Plan       Diagnosis Orders   1. Dyspnea, unspecified type       Dizziness  Fatigue   Atypical chest pain  Shortness of breath  COVID    Will get holter and Echo  Follow up after testing  States all of her symptoms are since COVID19 infection  The patient is asked to make an attempt to improve diet and exercise patterns to aid in medical management of this problem. Advised more plant based nutrition/meditarrean diet   Advised patient to call office or seek immediate medical attention if there is any new onset of  any chest pain, sob, palpitations, lightheadedness, dizziness, orthopnea, PND or pedal edema. All medication side effects were discussed in details. Thank youfor allowing me to participate in the care of this patient. Please do not hesitate to contact me for any further questions. Return in about 3 months (around 6/26/2021), or if symptoms worsen or fail to improve, for Regular follow up, Review testing.        Electronically signed by Nadege Spencer MD [facility]  3/26/2021 at 10:53 AM EDT

## 2021-03-26 NOTE — PROGRESS NOTES
Patient presents in office today for a F/u s/p Emergency room visit for chest pain. Patient states at current that she is not experiencing any chest pain, but that her chest always feels heavy, having some s.o.b, denies palpitations, experienced some light headedness, and dizziness yesterday but not currently. Denies SARI.

## 2021-04-06 ENCOUNTER — HOSPITAL ENCOUNTER (OUTPATIENT)
Dept: NON INVASIVE DIAGNOSTICS | Age: 51
Discharge: HOME | End: 2021-04-06
Payer: COMMERCIAL

## 2021-04-06 DIAGNOSIS — R06.00 DYSPNEA, UNSPECIFIED TYPE: ICD-10-CM

## 2021-04-06 LAB
LV EF: 58 %
LVEF MODALITY: NORMAL

## 2021-04-06 PROCEDURE — 93306 TTE W/DOPPLER COMPLETE: CPT

## 2021-04-06 PROCEDURE — 93225 XTRNL ECG REC<48 HRS REC: CPT

## 2021-04-06 PROCEDURE — 93226 XTRNL ECG REC<48 HR SCAN A/R: CPT

## 2021-04-06 NOTE — PROCEDURES
The skin was prepped and a holter monitor was applied. The patient was instructed on the documentation of symptoms and the purpose of the holter as well as the things to avoid while wearing the holter. The patient was instructed to remove and return the holter on 04/08/2021.   The serial number of the holter that was applied is [id number]

## 2021-04-12 LAB
ACQUISITION DURATION: NORMAL S
AVERAGE HEART RATE: 79 BPM
HOOKUP DATE: NORMAL
HOOKUP TIME: NORMAL
MAX HEART RATE TIME/DATE: NORMAL
MAX HEART RATE: 110 BPM
MIN HEART RATE TIME/DATE: NORMAL
MIN HEART RATE: 52 BPM
NUMBER OF QRS COMPLEXES: NORMAL
NUMBER OF SUPRAVENTRICULAR COUPLETS: 0
NUMBER OF SUPRAVENTRICULAR ECTOPICS: 29
NUMBER OF SUPRAVENTRICULAR ISOLATED BEATS: 23
NUMBER OF VENTRICULAR BIGEMINAL CYCLES: 0
NUMBER OF VENTRICULAR COUPLETS: 0
NUMBER OF VENTRICULAR ECTOPICS: 1

## 2021-05-13 ENCOUNTER — OFFICE VISIT (OUTPATIENT)
Dept: INTERNAL MEDICINE CLINIC | Age: 51
End: 2021-05-13
Payer: COMMERCIAL

## 2021-05-13 VITALS
TEMPERATURE: 97.9 F | HEART RATE: 72 BPM | SYSTOLIC BLOOD PRESSURE: 126 MMHG | WEIGHT: 248 LBS | BODY MASS INDEX: 42.34 KG/M2 | HEIGHT: 64 IN | DIASTOLIC BLOOD PRESSURE: 80 MMHG

## 2021-05-13 DIAGNOSIS — J06.9 VIRAL UPPER RESPIRATORY TRACT INFECTION: ICD-10-CM

## 2021-05-13 DIAGNOSIS — R93.0 ABNORMAL CT SCAN OF HEAD: Primary | ICD-10-CM

## 2021-05-13 PROCEDURE — 99213 OFFICE O/P EST LOW 20 MIN: CPT | Performed by: INTERNAL MEDICINE

## 2021-05-13 NOTE — PROGRESS NOTES
[address] [facility]   [facility], [place] [address]  [address] [facility], [address]  Dept: [phone]  Dept Fax: [phone]    YOB: 1970    URI symptoms     53 y.o. female   here for follow-up of above issues x 3-4 days,   Has sore throat, SOB, head and chest congestion, no fever chills. had COVID  About 4 months ago, recuperated at home . Diarrhea started today,  Loose BM , non bloody 3-4 times today. Denies any recent use of antibiotics, used sudafed recently and mucinex. She was in ER 3/25 abnormal CT head , due to lesion temp lobe, she was instructed to follow with a neurologist, but she did not follow up. Denies getting an MRI, no LOC. She is a mildly obese WF, had some fog post covid, overall feels better till above URI symptoms. Ct chest was negative for PE. Seen Mattie Hong CNP in the past, and I am seeing for the first time. She denies any significant weight loss and no recent visual disturbances. 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 Current Outpatient Medications   Medication Sig Dispense Refill    Dextromethorphan-guaiFENesin (MUCINEX DM MAXIMUM STRENGTH)  MG TB12 Take 1 tablet by mouth 2 times daily      pseudoephedrine (SUDAFED) 30 MG tablet Take 30 mg by mouth every 4 hours as needed for Congestion      ferrous sulfate 325 (65 Fe) MG tablet Take 1 tablet by mouth 2 times daily 30 tablet 3    omeprazole (PRILOSEC) 20 MG delayed release capsule Take 1 capsule by mouth every morning (before breakfast) 30 capsule 5    loratadine (CLARITIN) 10 MG capsule Take 10 mg by mouth daily as needed      ibuprofen (ADVIL;MOTRIN) 200 MG tablet Take 200 mg by mouth every 6 hours as needed for Pain      fluticasone (FLONASE) 50 MCG/ACT nasal spray 2 sprays by Each Nare route daily 1 Bottle 0     No current facility-administered medications for this visit.         Past Medical History:   Diagnosis Date    Gall stones        Social History     Socioeconomic History  Marital status:      Spouse name: Not on file    Number of children: Not on file    Years of education: Not on file    Highest education level: Not on file   Occupational History    Not on file   Social Needs    Financial resource strain: Not hard at all    Food insecurity     Worry: Never true     Inability: Never true   [employer] needs     Medical: No     Non-medical: No   Tobacco Use    Smoking status: Never Smoker    Smokeless tobacco: Never Used   Substance and Sexual Activity    Alcohol use: No    Drug use: No    Sexual activity: Yes     Partners: Male   Lifestyle    Physical activity     Days per week: Not on file     Minutes per session: Not on file    Stress: Not on file   Relationships    Social connections     Talks on phone: Not on file     Gets together: Not on file     Attends Synagogue service: Not on file     Active member of club or organization: Not on file     Attends meetings of clubs or organizations: Not on file     Relationship status: Not on file    Intimate partner violence     Fear of current or ex partner: Not on file     Emotionally abused: Not on file     Physically abused: Not on file     Forced sexual activity: Not on file   Other Topics Concern    Not on file   Social History Narrative    Not on file       Family History   Problem Relation Age of Onset    Heart Disease Mother     Diabetes Maternal Grandfather     Heart Disease Paternal Grandfather     Dementia Paternal Grandfather     Diabetes Paternal Grandfather     Diabetes Maternal Grandmother        Allergies   Allergen Reactions    Sertraline      EXCESSIVE BLEEDING.         Review of Systems     See HPI    /80 (Site: Left Upper Arm)   Pulse 72   Temp 97.9 °F (36.6 °C)   Ht 5' 4\" (1.626 m)   Wt 248 lb (112.5 kg)   LMP  (Within Weeks)   BMI 42.57 kg/m²      Physical Examination: General appearance - patient alert, well appearing, and in no distress, Ear exams - TM intact , but congested   Mental status - alert and oriented    Neck - supple, no significant adenopathy  Chest - clear to auscultation, no wheezes, rales or rhonchi, symmetric air entry  Heart - normal rate, regular rhythm, normal S1, S2, no murmurs, rubs, clicks or gallops  Abdomen - soft, nontender, nondistended, no masses or organomegaly  no abdominal bruits. Obese Protuberant: No  Neurological - alert, oriented, normal speech, no focal findings or movement disorder noted, motor and sensory grossly normal bilaterally  Musculoskeletal - no joint tenderness, deformity or swelling  Extremities - peripheral pulses normal, no pedal edema, no clubbing or cyanosis, Toshia's sign negative bilaterally  Prosthesis: No  Skin - normal coloration and turgor, no rashes, no suspicious skin lesions noted      I have reviewed recent diagnostic testing including labs, EKG. Please see EKG for interpretation. Diagnosis Orders   1. Abnormal CT scan of head  MRI BRAIN W WO CONTRAST   2.  Viral upper respiratory tract infection         Orders Placed This Encounter   Procedures    MRI BRAIN W WO CONTRAST     Standing Status:   Future     Standing Expiration Date:   9/13/2021       Outpatient Encounter Medications as of 5/13/2021   Medication Sig Dispense Refill    Dextromethorphan-guaiFENesin (MUCINEX DM MAXIMUM STRENGTH)  MG TB12 Take 1 tablet by mouth 2 times daily      pseudoephedrine (SUDAFED) 30 MG tablet Take 30 mg by mouth every 4 hours as needed for Congestion      ferrous sulfate 325 (65 Fe) MG tablet Take 1 tablet by mouth 2 times daily 30 tablet 3    omeprazole (PRILOSEC) 20 MG delayed release capsule Take 1 capsule by mouth every morning (before breakfast) 30 capsule 5    loratadine (CLARITIN) 10 MG capsule Take 10 mg by mouth daily as needed      ibuprofen (ADVIL;MOTRIN) 200 MG tablet Take 200 mg by mouth every 6 hours as needed for Pain      fluticasone (FLONASE) 50 MCG/ACT nasal spray 2 sprays by Each Nare route daily 1 Bottle 0    [DISCONTINUED] albuterol sulfate HFA (VENTOLIN HFA) 108 (90 Base) MCG/ACT inhaler Inhale 2 puffs into the lungs 4 times daily as needed for Wheezing (Patient not taking: Reported on 5/13/2021) 1 Inhaler 0     No facility-administered encounter medications on file as of 5/13/2021. Controlled Substances Monitoring: Will schedule follow up appointment in 4 months. Plan:    Viral URI likely -Encouraged Matt Lira to take  [place] of fluids,  Take Tylenol and claritin D or Zyrtec D and call us if symptoms worsen, she is a non smoker. No Antibiotics at this time. And she agreed with the plan      Obtain MRI of the head due to recent abnormal CT head,for which   She did not follow up with neuro as recommended at that time. She was encouraged to follow up with this. Signed by: Nupur Stoddard M.D.     [address] [facility]  [address], [facility] Sean Contreras, [facility]    Tel  [phone]   Fax [phone]

## 2021-05-13 NOTE — PATIENT INSTRUCTIONS
Take sudafed free decongestants for 4-5 days .  And hold your sudafed    Or you can take claritin D or zyrtec D

## 2021-05-25 ENCOUNTER — HOSPITAL ENCOUNTER (OUTPATIENT)
Dept: MRI IMAGING | Age: 51
Discharge: HOME | End: 2021-05-25
Payer: COMMERCIAL

## 2021-05-25 DIAGNOSIS — R93.0 ABNORMAL CT SCAN OF HEAD: ICD-10-CM

## 2021-05-25 PROCEDURE — A9579 GAD-BASE MR CONTRAST NOS,1ML: HCPCS | Performed by: INTERNAL MEDICINE

## 2021-05-25 PROCEDURE — 6360000004 HC RX CONTRAST MEDICATION: Performed by: INTERNAL MEDICINE

## 2021-05-25 PROCEDURE — 70553 MRI BRAIN STEM W/O & W/DYE: CPT

## 2021-05-25 RX ADMIN — GADOTERIDOL 20 ML: 279.3 INJECTION, SOLUTION INTRAVENOUS at 22:26

## 2021-05-28 ENCOUNTER — TELEPHONE (OUTPATIENT)
Dept: INTERNAL MEDICINE CLINIC | Age: 51
End: 2021-05-28

## 2021-05-28 DIAGNOSIS — R90.89 ABNORMAL FINDING ON MRI OF BRAIN: Primary | ICD-10-CM

## 2021-05-28 RX ORDER — AMOXICILLIN AND CLAVULANATE POTASSIUM 875; 125 MG/1; MG/1
1 TABLET, FILM COATED ORAL 2 TIMES DAILY
Qty: 14 TABLET | Refills: 0 | Status: SHIPPED | OUTPATIENT
Start: 2021-05-28 | End: 2021-06-04

## 2021-06-25 ENCOUNTER — OFFICE VISIT (OUTPATIENT)
Dept: CARDIOLOGY CLINIC | Age: 51
End: 2021-06-25
Payer: COMMERCIAL

## 2021-06-25 VITALS
BODY MASS INDEX: 42.51 KG/M2 | SYSTOLIC BLOOD PRESSURE: 132 MMHG | WEIGHT: 249 LBS | HEART RATE: 64 BPM | HEIGHT: 64 IN | DIASTOLIC BLOOD PRESSURE: 80 MMHG

## 2021-06-25 DIAGNOSIS — R07.89 ATYPICAL CHEST PAIN: Primary | ICD-10-CM

## 2021-06-25 PROCEDURE — 99213 OFFICE O/P EST LOW 20 MIN: CPT | Performed by: INTERNAL MEDICINE

## 2021-06-25 NOTE — LETTER
[address] [facility]  [facility].  [address]  [facility] [address]  Phone: [phone]  Fax: [phone]    Keon Yang MD        June 25, 2021     Patient: Eloise Mancia   YOB: 1970   Date of Visit: 6/25/2021       To Whom it May Concern:    Eloise Mancia was seen in my clinic on 6/25/2021. If you have any questions or concerns, please don't hesitate to call.     Sincerely,         Keon Yang MD

## 2021-06-25 NOTE — PROGRESS NOTES
[address]  [place] [address]  Dept: [phone]  Dept Fax: [phone]  Loc: [phone]    Visit Date: 6/25/2021    Ms. Florencia Mclean is a 53 y.o. female  who presented for:  Chief Complaint   Patient presents with    Check-Up    Shortness of Breath       HPI:   52 yo F c hx of recent COVID19 is here for an evaluation after being int  ED. She was in the ED yesterday for dizziness, lightheadedness, fatigue and nausea. She use to see Dr. Tran Dickson in the past.  She had stress and echo which was apparently normal.  Yesterday EKG shows SR, no ST-T changes. She states she physically cannot do too much since [address]. Did report some chest pains and shortness of breath while in the ED. She underwent holter and echo and is here for a followup. Current Outpatient Medications:     Dextromethorphan-guaiFENesin (MUCINEX DM MAXIMUM STRENGTH)  MG TB12, Take 1 tablet by mouth as needed , Disp: , Rfl:     pseudoephedrine (SUDAFED) 30 MG tablet, Take 30 mg by mouth every 4 hours as needed for Congestion, Disp: , Rfl:     ferrous sulfate 325 (65 Fe) MG tablet, Take 1 tablet by mouth 2 times daily, Disp: 30 tablet, Rfl: 3    omeprazole (PRILOSEC) 20 MG delayed release capsule, Take 1 capsule by mouth every morning (before breakfast), Disp: 30 capsule, Rfl: 5    loratadine (CLARITIN) 10 MG capsule, Take 10 mg by mouth daily as needed, Disp: , Rfl:     ibuprofen (ADVIL;MOTRIN) 200 MG tablet, Take 200 mg by mouth every 6 hours as needed for Pain, Disp: , Rfl:     fluticasone (FLONASE) 50 MCG/ACT nasal spray, 2 sprays by Each Nare route daily, Disp: 1 Bottle, Rfl: 0    Past Medical History  Cordelia Hanley  has a past medical history of Gall stones. Social History  Cordelia Hanley  reports that she has never smoked. She has never used smokeless tobacco. She reports that she does not drink alcohol and does not use drugs.     Family History  Junior Leung family history includes Dementia in her paternal grandfather; Diabetes in her maternal grandfather, maternal grandmother, and paternal grandfather; Heart Disease in her mother and paternal grandfather. Past Surgical History   Past Surgical History:   Procedure Laterality Date    NASAL FRACTURE SURGERY      TUBAL LIGATION         Subjective:     REVIEW OF SYSTEMS  Constitutional: denies sweats, chills and fever  HENT: denies  congestion, sinus pressure, sneezing and sore throat. Eyes: denies  pain, discharge, redness and itching. Respiratory: denies apnea, cough  Gastrointestinal: denies blood in stool, constipation, diarrhea   Endocrine: denies cold intolerance, heat intolerance, polydipsia. Genitourinary: denies dysuria, enuresis, flank pain and hematuria. Musculoskeletal: denies arthralgias, joint swelling and neck pain. Neurological: denies numbness and headaches. Psychiatric/Behavioral: denies agitation, confusion, decreased concentration and dysphoric mood    All others reviewed and are negative. Objective:     /80   Pulse 64   Ht 5' 4\" (1.626 m)   Wt 249 lb (112.9 kg)   BMI 42.74 kg/m²     Wt Readings from Last 3 Encounters:   06/25/21 249 lb (112.9 kg)   05/13/21 248 lb (112.5 kg)   03/26/21 243 lb 1.6 oz (110.3 kg)     BP Readings from Last 3 Encounters:   06/25/21 132/80   05/13/21 126/80   03/26/21 120/78       PHYSICAL EXAM  Constitutional: Oriented to person, place, and time. Appears well-developed and well-nourished. HENT:   Head: Normocephalic and atraumatic. Eyes: EOM are normal. Pupils are equal, round, and reactive to light. Neck: Normal range of motion. Neck supple. No JVD present. Cardiovascular: Normal rate , normal heart sounds and intact distal pulses. Pulmonary/Chest: Effort normal and breath sounds normal. No respiratory distress. No wheezes. No rales. Abdominal: Soft. Bowel sounds are normal. No distension. There is no tenderness. Musculoskeletal: Normal range of motion. No edema. Neurological: Alert and oriented to person, place, and time. No cranial nerve deficit. Coordination normal.   Skin: Skin is warm and dry. Psychiatric: Normal mood and affect. No results found for: CKTOTAL, CKMB, CKMBINDEX    Lab Results   Component Value Date    WBC 7.3 03/25/2021    RBC 4.35 03/25/2021    HGB 12.9 03/25/2021    HCT 39.5 03/25/2021    MCV 90.8 03/25/2021    MCH 29.7 03/25/2021    MCHC 32.7 03/25/2021    RDW 14.7 09/23/2017     03/25/2021    MPV 9.7 03/25/2021       Lab Results   Component Value Date     03/25/2021    K 3.9 03/25/2021     03/25/2021    CO2 24 03/25/2021    BUN 17 03/25/2021    LABALBU 4.0 03/25/2021    LABALBU 4.0 03/25/2021    CREATININE 0.6 03/25/2021    CALCIUM 8.6 03/25/2021    LABGLOM >90 03/25/2021    GLUCOSE 110 03/25/2021       Lab Results   Component Value Date    ALKPHOS 84 03/25/2021    ALKPHOS 79 03/25/2021    ALT 24 03/25/2021    ALT 24 03/25/2021    AST 22 03/25/2021    AST 21 03/25/2021    PROT 7.1 03/25/2021    PROT 6.9 03/25/2021    BILITOT 0.7 03/25/2021    BILITOT 0.7 03/25/2021    BILIDIR <0.2 03/25/2021    LABALBU 4.0 03/25/2021    LABALBU 4.0 03/25/2021       No results found for: MG    Lab Results   Component Value Date    INR 1.00 04/11/2019         Lab Results   Component Value Date    LABA1C 5.3 06/10/2019       Lab Results   Component Value Date    TRIG 34 06/10/2019    HDL 69 02/18/2021    LDLCALC 50 02/18/2021       Lab Results   Component Value Date    TSH 1.420 03/16/2019         Testing Reviewed:      I haveindividually reviewed the below cardiac tests    EKG:    ECHO:   Results for orders placed during the hospital encounter of 03/11/15   Echocardiogram 2D/ M-Mode/ Colorflow/ Do       STRESS:    CATH:    Assessment/Plan       Diagnosis Orders   1.  Atypical chest pain         Fatigue likely 2/2 to COVID infection  Atypical chest pain  Shortness of breath      Reviewed holter and Echo  No significant arrhythmias  Echo showed normal LVSF EF 60%  Follow up after testing  States all of her symptoms are since COVID19 infection  The patient is asked to make an attempt to improve diet and exercise patterns to aid in medical management of this problem. Advised more plant based nutrition/meditarrean diet   Advised patient to call office or seek immediate medical attention if there is any new onset of  any chest pain, sob, palpitations, lightheadedness, dizziness, orthopnea, PND or pedal edema. All medication side effects were discussed in details. Thank youfor allowing me to participate in the care of this patient. Please do not hesitate to contact me for any further questions. Return if symptoms worsen or fail to improve, for Review testing, Regular follow up.        Electronically signed by Nasreen Dang MD [facility]  6/25/2021 at 10:53 AM EDT

## 2021-07-08 ENCOUNTER — OFFICE VISIT (OUTPATIENT)
Dept: NEUROSURGERY | Age: 51
End: 2021-07-08
Payer: COMMERCIAL

## 2021-07-08 VITALS
SYSTOLIC BLOOD PRESSURE: 131 MMHG | WEIGHT: 248.9 LBS | HEIGHT: 64 IN | HEART RATE: 70 BPM | TEMPERATURE: 97.5 F | DIASTOLIC BLOOD PRESSURE: 69 MMHG | BODY MASS INDEX: 42.49 KG/M2

## 2021-07-08 DIAGNOSIS — Z87.898 HX OF HEADACHE: ICD-10-CM

## 2021-07-08 DIAGNOSIS — D32.9 MENINGIOMA (HCC): Primary | ICD-10-CM

## 2021-07-08 PROCEDURE — 99214 OFFICE O/P EST MOD 30 MIN: CPT | Performed by: PHYSICIAN ASSISTANT

## 2021-07-08 ASSESSMENT — ENCOUNTER SYMPTOMS
SHORTNESS OF BREATH: 0
ABDOMINAL PAIN: 0
APNEA: 0
CHEST TIGHTNESS: 0
ABDOMINAL DISTENTION: 0

## 2021-07-08 NOTE — PROGRESS NOTES
[facility] PROFESSIONAL SERVS  [address] [place] [address]  Dept: [phone]  Dept Fax: [phone]      Patient Name:  Aleksandar Ruiz  Visit Date:  7/8/2021    HPI:     Ms. Karen James is a 53 y.o. female that presents today at [facility] for evaluation of the following: Evaluation of findings significant for meningioma on MRI. Chief Complaint   Patient presents with    Consultation     Abnormal brain MRI        HPI   Mrs. Karen James is a pleasant 81-year-old female, a never smoker of tobacco and nonuser of alcohol with a ultimately uncomplicated medical history significant for history of gallstones and a surgical history significant only for nasal fracture surgery. She presents today as a referral to our service for evaluation of findings significant for meningioma on an MRI of the brain imaged with and without contrast on 5/25/2021 which is a follow-up to a CT scan imaged on 3/25/2021 with likely meningioma noted, this following an emergency department visit to address symptoms included dizziness worsened with movement and nausea and exertional dyspnea. He related mild headaches at admission. She was treated for upper respiratory infection symptoms with noted improvement detailed via internal medicine on 5/13/2021. She presents today unaccompanied and without significant complaints. Dates that the symptoms he presented with an emergency department is completely resolved following antibiotic therapy. She relates occasional headaches described as including an aura suggestive of possible migraine. She does not currently follow with a neurologist but is asked for referral.  On physical exam she is intact for strength and sensation bilaterally and symmetrically with a smooth gait and no visual disturbances or pronator drift noted.   Denies significant issues with gait or imbalance and was free of neurological deficits at exam. Medications:    Current Outpatient Medications:     pseudoephedrine (SUDAFED) 30 MG tablet, Take 30 mg by mouth every 4 hours as needed for Congestion, Disp: , Rfl:     ferrous sulfate 325 (65 Fe) MG tablet, Take 1 tablet by mouth 2 times daily, Disp: 30 tablet, Rfl: 3    omeprazole (PRILOSEC) 20 MG delayed release capsule, Take 1 capsule by mouth every morning (before breakfast), Disp: 30 capsule, Rfl: 5    loratadine (CLARITIN) 10 MG capsule, Take 10 mg by mouth daily as needed, Disp: , Rfl:     ibuprofen (ADVIL;MOTRIN) 200 MG tablet, Take 200 mg by mouth every 6 hours as needed for Pain, Disp: , Rfl:     fluticasone (FLONASE) 50 MCG/ACT nasal spray, 2 sprays by Each Nare route daily, Disp: 1 Bottle, Rfl: 0    Dextromethorphan-guaiFENesin (MUCINEX DM MAXIMUM STRENGTH)  MG TB12, Take 1 tablet by mouth as needed  (Patient not taking: Reported on 7/8/2021), Disp: , Rfl:     The patient is allergic to sertraline. Past Medical History  Layton Bowie  has a past medical history of Gall stones. Past Surgical History  The patient  has a past surgical history that includes Tubal ligation and Nasal fracture surgery. Family History  This patient's family history includes Dementia in her paternal grandfather; Diabetes in her maternal grandfather, maternal grandmother, and paternal grandfather; Heart Disease in her mother and paternal grandfather. Social History  Layton Bowie  reports that she has never smoked. She has never used smokeless tobacco. She reports that she does not drink alcohol and does not use drugs. Subjective:      Review of Systems   Constitutional: Negative for activity change and fatigue. Respiratory: Negative for apnea, chest tightness and shortness of breath. Cardiovascular: Negative for chest pain and leg swelling. Gastrointestinal: Negative for abdominal distention and abdominal pain. Musculoskeletal: Negative for gait problem. Neurological: Positive for headaches.  Negative for tremors, seizures, facial asymmetry, weakness and numbness. Psychiatric/Behavioral: Negative for agitation, behavioral problems, confusion and decreased concentration. Objective:     Temp 97.5 °F (36.4 °C)   Ht 5' 4.02\" (1.626 m)   Wt 248 lb 14.4 oz (112.9 kg)   BMI 42.70 kg/m²          Physical Exam  Constitutional:       General: She is not in acute distress. Appearance: She is obese. HENT:      Head: Normocephalic and atraumatic. Eyes:      General: No visual field deficit. Extraocular Movements: Extraocular movements intact. Pupils: Pupils are equal, round, and reactive to light. Cardiovascular:      Rate and Rhythm: Normal rate and regular rhythm. Pulses: Normal pulses. Heart sounds: No murmur heard. Pulmonary:      Effort: Pulmonary effort is normal. No respiratory distress. Breath sounds: Normal breath sounds. No wheezing. Abdominal:      General: Bowel sounds are normal.   Musculoskeletal:         General: Normal range of motion. Cervical back: Normal range of motion. No tenderness. Skin:     General: Skin is warm and dry. Neurological:      General: No focal deficit present. Mental Status: She is alert and oriented to person, place, and time. Cranial Nerves: No facial asymmetry. Sensory: No sensory deficit. Motor: No weakness or pronator drift. Coordination: Finger-Nose-Finger Test and Heel to [employer] normal.      Gait: Gait normal.   Psychiatric:         Mood and Affect: Mood normal.         Behavior: Behavior normal.         Thought Content:  Thought content normal.         Judgment: Judgment normal.         Reviewed MRI Type:  Film and Report    Lab Results   Component Value Date    WBC 7.3 03/25/2021    HGB 12.9 03/25/2021    HCT 39.5 03/25/2021     03/25/2021    CHOL 114 06/10/2019    TRIG 34 06/10/2019    HDL 69 02/18/2021    ALT 24 03/25/2021    ALT 24 03/25/2021    AST 22 03/25/2021    AST 21 03/25/2021  03/25/2021    K 3.9 03/25/2021     03/25/2021    CREATININE 0.6 03/25/2021    BUN 17 03/25/2021    CO2 24 03/25/2021    TSH 1.420 03/16/2019    INR 1.00 04/11/2019    GLUF 109 (H) 02/18/2021    LABA1C 5.3 06/10/2019       Assessment and Plan      Diagnosis Orders   1. Meningioma (HCC)         Based on the stable intact nature of her exam and findings on imaging consistent with small right-sided parietal meningioma, along with her desire to avoid surgery, we have agreed to follow-up with her in 3 months with a new MRI of the brain to be imaged with and without contrast as a comparison study. A referral to neurology for evaluation of headaches if processed at today's visit as well. Discussion was had involving surgical intervention as an option. We discussed the procedure in some detail along with expectations for recovery and the associated risks which include, but not limited to; bleeding, infection, anesthetic complication, neurologic injury, incomplete relief of symptoms, the need for future surgery and even death. She has reiterated her desire to avoid surgery if at all possible and is elected for conservative treatment with serial imaging. She is very happy with today's appointment having her question concerns addressed and answered and looks forward to her follow-up appointment in 3 months.        Electronically signed by Chelle Felder PA-C on 7/8/2021 at 9:18 AM

## 2021-07-26 ENCOUNTER — HOSPITAL ENCOUNTER (OUTPATIENT)
Dept: NEUROLOGY | Age: 51
Discharge: HOME | End: 2021-07-26
Payer: COMMERCIAL

## 2021-07-26 DIAGNOSIS — D32.9 MENINGIOMA (HCC): ICD-10-CM

## 2021-07-26 DIAGNOSIS — Z87.898 HX OF HEADACHE: ICD-10-CM

## 2021-07-26 PROCEDURE — 95819 EEG AWAKE AND ASLEEP: CPT

## 2021-07-26 NOTE — PROGRESS NOTES
[address] [facility] Laboratory Technician worksheet       EEG Date: 2021    Name: Dahlia Welch   : 1970   Age: 53 y.o. SEX: female    Room: OP    MRN: [id number]     CSN: [id number]    Ordering Provider: [facility]  EEG Number: [id number] Time of Test:  6700    Hand: Right   Sedation: No    H.V. Done: No NOT DONE Photic: Yes    Sleep: No   Drowsy: Yes   Sleep Deprived: No    Seizures observed: no    Mentality: alert       Clinical History: FREQUENT H/A'S, DIZZINESS, MEMORY PROBLEMS. MRI IN MAY   1. 1.7 cm meningioma at the high right parietal region without significant mass effect on the underlying parenchyma. 2. Moderate to severe sinusitis.             Past Medical History:       Diagnosis Date    Gall stones          Prior to Admission medications    Medication Sig Start Date End Date Taking?  Authorizing Provider   Dextromethorphan-guaiFENesin ([place] [address] DM MAXIMUM STRENGTH)  MG TB12 Take 1 tablet by mouth as needed   Patient not taking: Reported on 2021    Historical Provider, MD   pseudoephedrine (SUDAFED) 30 MG tablet Take 30 mg by mouth every 4 hours as needed for Congestion    Historical Provider, [place]   ferrous sulfate 325 (65 Fe) MG tablet Take 1 tablet by mouth 2 times daily 19   EMI Nicole CNP   omeprazole (PRILOSEC) 20 MG delayed release capsule Take 1 capsule by mouth every morning (before breakfast) 19   EMI Nicole CNP   loratadine (CLARITIN) 10 MG capsule Take 10 mg by mouth daily as needed    Historical Provider, [place]   ibuprofen (ADVIL;MOTRIN) 200 MG tablet Take 200 mg by mouth every 6 hours as needed for Pain    Historical Provider, MD   fluticasone [facility]) 50 MCG/ACT nasal spray 2 sprays by Each Nare route daily 19   EMI Nicole CNP       Technician: Ena Bynum RCP 2021

## 2021-07-28 PROCEDURE — 95816 EEG AWAKE AND DROWSY: CPT | Performed by: PSYCHIATRY & NEUROLOGY

## 2021-07-28 NOTE — PROCEDURES
[address] [place] [address]                          ELECTROENCEPHALOGRAM REPORT    PATIENT NAME: Shonda Mcqueen                       :        1970  MED REC NO:   [id number]                           ROOM:  ACCOUNT NO:   [de-identified]                           ADMIT DATE: 2021  PROVIDER:     Rozina Funes. Jay Lu MD    DATE OF EE2021    REFERRING PROVIDER:  Scott Billingsley PA-C    CLINICAL HISTORY:  This is a 46-year-old female with frequent falls,  headache, dizziness, memory problem. The patient has a 1.7 cm  meningioma in the high right parietal region without mass effect. Medications listed are Sudafed, Prilosec, Claritin, Advil, Motrin,  fluticasone. This is a 17-channel EEG performed without sleep deprivation. Hyperventilation was not performed. Photic stimulation was performed. The patient is described as alert. The background rhythm activity is noted to be 9 Hz in the posterior  parietal area, symmetric, attenuates with eye opening. Lead artifact  was noted limiting quality of data obtained. Hyperventilation was not  performed. Muscle artifact was noted. Photic stimulation was performed  without abnormality. Fast beta activity was noted suggestive of mild  cortical dysfunction such as seen with metabolic causes, medication  effects, or nonspecific encephalopathies. IMPRESSION:  This is a mildly abnormal EEG due to the presence of fast  beta activity suggestive of mild cortical dysfunction such as seen with  metabolic causes, medication effects (example benzodiazepines) or  nonspecific encephalopathies. There was no evidence of epileptiform  activity appreciated.         Glen Razo MD    D: 2021 7:54:14       T: 2021 8:02:01     [email or web address]  Job#: [id number]     Doc#: [id number]    CC:

## 2021-10-12 ENCOUNTER — NURSE ONLY (OUTPATIENT)
Dept: FAMILY MEDICINE CLINIC | Age: 51
End: 2021-10-12
Payer: COMMERCIAL

## 2021-10-12 DIAGNOSIS — R06.02 SHORTNESS OF BREATH: ICD-10-CM

## 2021-10-12 DIAGNOSIS — R05.9 COUGH: Primary | ICD-10-CM

## 2021-10-12 LAB
Lab: NORMAL
PERFORMING INSTRUMENT: NORMAL
QC PASS/FAIL: NORMAL
SARS-COV-2, POC: NORMAL

## 2021-10-12 PROCEDURE — 87426 SARSCOV CORONAVIRUS AG IA: CPT | Performed by: FAMILY MEDICINE

## 2021-10-14 LAB
SARS-COV-2: NOT DETECTED
SOURCE: NORMAL

## 2021-10-28 ENCOUNTER — HOSPITAL ENCOUNTER (OUTPATIENT)
Dept: MRI IMAGING | Age: 51
Discharge: HOME | End: 2021-10-28
Payer: COMMERCIAL

## 2021-10-28 DIAGNOSIS — D32.9 MENINGIOMA (HCC): ICD-10-CM

## 2021-10-28 PROCEDURE — 70553 MRI BRAIN STEM W/O & W/DYE: CPT

## 2021-10-28 PROCEDURE — A9579 GAD-BASE MR CONTRAST NOS,1ML: HCPCS | Performed by: PHYSICIAN ASSISTANT

## 2021-10-28 PROCEDURE — 6360000004 HC RX CONTRAST MEDICATION: Performed by: PHYSICIAN ASSISTANT

## 2021-10-28 RX ADMIN — GADOTERIDOL 20 ML: 279.3 INJECTION, SOLUTION INTRAVENOUS at 17:01

## 2022-04-25 ENCOUNTER — HOSPITAL ENCOUNTER (EMERGENCY)
Age: 52
Discharge: HOME | End: 2022-04-25
Payer: COMMERCIAL

## 2022-04-25 VITALS
BODY MASS INDEX: 44.98 KG/M2 | OXYGEN SATURATION: 97 % | WEIGHT: 270 LBS | DIASTOLIC BLOOD PRESSURE: 82 MMHG | HEIGHT: 65 IN | RESPIRATION RATE: 20 BRPM | TEMPERATURE: 98.2 F | HEART RATE: 103 BPM | SYSTOLIC BLOOD PRESSURE: 157 MMHG

## 2022-04-25 DIAGNOSIS — A08.4 VIRAL GASTROENTERITIS: Primary | ICD-10-CM

## 2022-04-25 PROCEDURE — 99213 OFFICE O/P EST LOW 20 MIN: CPT

## 2022-04-25 PROCEDURE — 99213 OFFICE O/P EST LOW 20 MIN: CPT | Performed by: NURSE PRACTITIONER

## 2022-04-25 RX ORDER — ONDANSETRON 4 MG/1
4 TABLET, ORALLY DISINTEGRATING ORAL EVERY 8 HOURS PRN
Qty: 30 TABLET | Refills: 0 | Status: SHIPPED | OUTPATIENT
Start: 2022-04-25

## 2022-04-25 ASSESSMENT — ENCOUNTER SYMPTOMS
RHINORRHEA: 0
SORE THROAT: 0
VOMITING: 1
COUGH: 0
NAUSEA: 1
DIARRHEA: 1
EYE DISCHARGE: 0
SHORTNESS OF BREATH: 0
EYE REDNESS: 0
ABDOMINAL PAIN: 0
TROUBLE SWALLOWING: 0

## 2022-04-25 NOTE — ED PROVIDER NOTES
[address] [facility] Encounter      [address] [facility]       Chief Complaint   Patient presents with    Diarrhea       Nurses Notes reviewed and I agree except as noted in the HPI. HISTORY OF PRESENT ILLNESS   Jayme Nagy is a 49 y.o. female who presents for evaluation of nausea/vomiting and diarrhea. Onset of symptoms today, unchanged. Patient notes 12 episodes of nonbloody diarrhea. Associated fatigue, lightheadedness, dizziness. Denies chest pain, shortness of breath, leg swelling. No travel. No changes in diet. No antibiotics. No illness exposure. No undercooked food. Patient tried taking Imodium today, she vomited medication. No blood in emesis. No additional complaints. REVIEW OF SYSTEMS     Review of Systems   Constitutional: Positive for appetite change. Negative for chills, diaphoresis, fatigue and fever. HENT: Negative for congestion, ear pain, rhinorrhea, sore throat and trouble swallowing. Eyes: Negative for discharge and redness. Respiratory: Negative for cough and shortness of breath. Cardiovascular: Negative for chest pain. Gastrointestinal: Positive for diarrhea, nausea and vomiting. Negative for abdominal pain. Genitourinary: Negative for decreased urine volume and difficulty urinating. Musculoskeletal: Negative for neck pain and neck stiffness. Skin: Negative for rash. Neurological: Negative for headaches. Hematological: Negative for adenopathy. Psychiatric/Behavioral: Negative for sleep disturbance. PAST MEDICAL HISTORY         Diagnosis Date    Gall stones        SURGICAL HISTORY     Patient  has a past surgical history that includes Tubal ligation and Nasal fracture surgery.     CURRENT MEDICATIONS       Discharge Medication List as of 4/25/2022  6:58 PM      CONTINUE these medications which have NOT CHANGED    Details   Dextromethorphan-guaiFENesin (MUCINEX DM MAXIMUM STRENGTH)  MG TB12 Take 1 tablet by mouth as needed Historical Med      pseudoephedrine (SUDAFED) 30 MG tablet Take 30 mg by mouth every 4 hours as needed for CongestionHistorical Med      ferrous sulfate 325 (65 Fe) MG tablet Take 1 tablet by mouth 2 times daily, Disp-30 tablet, R-3Historical Med      omeprazole (PRILOSEC) 20 MG delayed release capsule Take 1 capsule by mouth every morning (before breakfast), Disp-30 capsule, R-5Normal      loratadine (CLARITIN) 10 MG capsule Take 10 mg by mouth daily as neededHistorical Med      ibuprofen (ADVIL;MOTRIN) 200 MG tablet Take 200 mg by mouth every 6 hours as needed for PainHistorical Med      fluticasone (FLONASE) 50 MCG/ACT nasal spray 2 sprays by Each Nare route daily, Disp-1 Bottle, R-0Normal             ALLERGIES     Patient is is allergic to sertraline. FAMILY HISTORY     Patient'sfamily history includes Dementia in her paternal grandfather; Diabetes in her maternal grandfather, maternal grandmother, and paternal grandfather; Heart Disease in her mother and paternal grandfather. SOCIAL HISTORY     Patient  reports that she has never smoked. She has never used smokeless tobacco. She reports that she does not drink alcohol and does not use drugs. PHYSICAL EXAM     ED TRIAGE VITALS  BP: (!) 157/82, Temp: 98.2 °F (36.8 °C), Pulse: 103, Resp: 20, SpO2: 97 %  Physical Exam  Vitals and nursing note reviewed. Constitutional:       General: She is not in acute distress. Appearance: Normal appearance. She is well-developed. She is not ill-appearing, toxic-appearing or diaphoretic. HENT:      Head: Normocephalic and atraumatic. Right Ear: Hearing, tympanic membrane, ear canal and external ear normal. No mastoid tenderness. No hemotympanum. Tympanic membrane is not perforated, erythematous or bulging. Left Ear: Hearing, tympanic membrane, ear canal and external ear normal. No mastoid tenderness. No hemotympanum. Tympanic membrane is not perforated, erythematous or bulging.       Nose: Nose normal.      Mouth/Throat:      Mouth: Mucous membranes are moist.      Pharynx: Oropharynx is clear. Uvula midline. Tonsils: No tonsillar abscesses. Eyes:      General: No scleral icterus. Conjunctiva/sclera: Conjunctivae normal.      Right eye: Right conjunctiva is not injected. No hemorrhage. Left eye: Left conjunctiva is not injected. No hemorrhage. Neck:      Thyroid: No thyromegaly. Trachea: Trachea normal.   Cardiovascular:      Rate and Rhythm: Normal rate and regular rhythm. No extrasystoles are present. Chest Wall: PMI is not displaced. Heart sounds: Normal heart sounds. No murmur heard. No friction rub. No gallop. Pulmonary:      Effort: Pulmonary effort is normal. No respiratory distress. Breath sounds: Normal breath sounds. Chest:   Breasts:      Right: No supraclavicular adenopathy. Left: No supraclavicular adenopathy. Musculoskeletal:      Cervical back: Normal range of motion and neck supple. Lymphadenopathy:      Head:      Right side of head: No submental, submandibular, tonsillar or occipital adenopathy. Left side of head: No submental, submandibular, tonsillar or occipital adenopathy. Cervical: No cervical adenopathy. Upper Body:      Right upper body: No supraclavicular adenopathy. Left upper body: No supraclavicular adenopathy. Skin:     General: Skin is warm and dry. Capillary Refill: Capillary refill takes less than 2 seconds. Coloration: Skin is not pale. Findings: No rash. Comments: Skin warm and dry to touch, no rashes noted on exposed surfaces. Neurological:      Mental Status: She is alert and oriented to person, place, and time. She is not disoriented. Psychiatric:         Mood and Affect: Mood normal.         Behavior: Behavior is cooperative. DIAGNOSTIC RESULTS   Labs: No results found for this visit on 04/25/22.     IMAGING:  No orders to display     URGENT CARE COURSE: Vitals:    04/25/22 1844   BP: (!) 157/82   Pulse: 103   Resp: 20   Temp: 98.2 °F (36.8 °C)   TempSrc: Infrared   SpO2: 97%   Weight: 270 lb (122.5 kg)   Height: 5' 5\" (1.651 m)       Medications - No data to display  PROCEDURES:  None  FINALIMPRESSION      1. Viral gastroenteritis        DISPOSITION/PLAN   DISPOSITION Decision To Discharge 04/25/2022 06:57:14 PM  Nontoxic, no distress. No acute abdomen. Exam consistent with viral gastroenteritis. Medication as prescribed, advance diet as tolerated. If symptoms worsen go to ER. PATIENT REFERRED TO:  Kapil Vasquez MD  [place] [address]  [phone]      Follow-up as needed. Medication as prescribed, advance diet as tolerated. Replace vomiting/diarrhea with electrolyte solution. If any distress go to ER.     DISCHARGE MEDICATIONS:  Discharge Medication List as of 4/25/2022  6:58 PM      START taking these medications    Details   ondansetron (ZOFRAN ODT) 4 MG disintegrating tablet Take 1 tablet by mouth every 8 hours as needed for Nausea or Vomiting, Disp-30 tablet, R-0Normal           Discharge Medication List as of 4/25/2022  6:58 PM          [address], APRN - CNP  04/25/22 2001

## 2022-04-25 NOTE — ED TRIAGE NOTES
Last week had head congestion/cough chills last week, diarrhea started 1530 today, has gone about 12 times, also has nausea, and dizziness , feels heart racing

## 2022-04-25 NOTE — Clinical Note
Audrey Christine was seen and treated in our emergency department on 4/25/2022. She is to be off 4/25-4/26 and 4/26-4/27. She may return her 4/27-4/28 shift.     EMI Foster - CNP

## 2022-04-25 NOTE — Clinical Note
Morrell Paget was seen and treated in our emergency department on 4/25/2022. She may return to work on 04/27/2022. If you have any questions or concerns, please don't hesitate to call.       Radha Camacho, APRN - CNP home

## 2023-03-20 ENCOUNTER — HOSPITAL ENCOUNTER (EMERGENCY)
Age: 53
Discharge: HOME | End: 2023-03-20
Payer: COMMERCIAL

## 2023-03-20 VITALS
HEART RATE: 72 BPM | SYSTOLIC BLOOD PRESSURE: 171 MMHG | DIASTOLIC BLOOD PRESSURE: 74 MMHG | RESPIRATION RATE: 16 BRPM | TEMPERATURE: 97.6 F | OXYGEN SATURATION: 97 %

## 2023-03-20 DIAGNOSIS — S39.012A STRAIN OF LUMBAR REGION, INITIAL ENCOUNTER: Primary | ICD-10-CM

## 2023-03-20 LAB
BILIRUB UR STRIP.AUTO-MCNC: NEGATIVE MG/DL
CHARACTER UR: CLEAR
COLOR: YELLOW
GLUCOSE UR QL STRIP.AUTO: NEGATIVE MG/DL
KETONES UR QL STRIP.AUTO: NEGATIVE
NITRITE UR QL STRIP.AUTO: NEGATIVE
PH UR STRIP.AUTO: 5.5 [PH] (ref 5–9)
PROT UR STRIP.AUTO-MCNC: NEGATIVE MG/DL
RBC #/AREA URNS HPF: NEGATIVE /[HPF]
SP GR UR STRIP.AUTO: >= 1.03 (ref 1–1.03)
UROBILINOGEN, URINE: 0.2 EU/DL (ref 0.2–1)
WBC #/AREA URNS HPF: ABNORMAL /[HPF]

## 2023-03-20 PROCEDURE — 99213 OFFICE O/P EST LOW 20 MIN: CPT | Performed by: NURSE PRACTITIONER

## 2023-03-20 PROCEDURE — 99213 OFFICE O/P EST LOW 20 MIN: CPT

## 2023-03-20 PROCEDURE — 87086 URINE CULTURE/COLONY COUNT: CPT

## 2023-03-20 PROCEDURE — 81003 URINALYSIS AUTO W/O SCOPE: CPT

## 2023-03-20 RX ORDER — KETOROLAC TROMETHAMINE 10 MG/1
10 TABLET, FILM COATED ORAL EVERY 6 HOURS PRN
Qty: 20 TABLET | Refills: 0 | Status: SHIPPED | OUTPATIENT
Start: 2023-03-20

## 2023-03-20 RX ORDER — CEPHALEXIN 500 MG/1
500 CAPSULE ORAL 2 TIMES DAILY
Qty: 14 CAPSULE | Refills: 0 | Status: SHIPPED | OUTPATIENT
Start: 2023-03-20 | End: 2023-03-27

## 2023-03-20 RX ORDER — TIZANIDINE 4 MG/1
4 TABLET ORAL 4 TIMES DAILY PRN
Qty: 20 TABLET | Refills: 0 | Status: SHIPPED | OUTPATIENT
Start: 2023-03-20

## 2023-03-20 RX ORDER — PREDNISONE 20 MG/1
20 TABLET ORAL 2 TIMES DAILY
Qty: 10 TABLET | Refills: 0 | Status: SHIPPED | OUTPATIENT
Start: 2023-03-20 | End: 2023-03-25

## 2023-03-20 ASSESSMENT — ENCOUNTER SYMPTOMS
SORE THROAT: 0
DIARRHEA: 0
VOMITING: 0
CHEST TIGHTNESS: 0
SHORTNESS OF BREATH: 0
NAUSEA: 0
COUGH: 0
RHINORRHEA: 0
BACK PAIN: 1

## 2023-03-20 NOTE — ED PROVIDER NOTES
Subjective      Reports contraction pain    Objective       Vitals:    10/07/21 1810   BP: 117/68   Pulse: 80   Resp: 18   Temp: 97.7 °F (36.5 °C)        SVE  Dilation:  4.5 (10/07/21 1856)  Effacement:  80 (10/07/21 1856)  Station:  -2 (10/07/21 1856)  Consistency:     Presentation:        Fetal Surveillance  Fetal Heart Rate  Mode: External US (10/07/21 1810)  Baseline: 155 bpm (10/07/21 1810)  Classification:    Variability: Minimal (10/07/21 1810)  Pattern: Variable decelerations (10/07/21 1810)     Uterine Activity  Mode: [place] R[place] (10/07/21 1810)  Frequency: adj (10/07/21 1810)  Duration: 60 (10/07/21 1520)  Quality: Mild (10/07/21 1810)  Resting Tone: Soft (10/07/21 1810)     Membrane Status: Ruptured (10/07/21 1520)  Sac Identifier: Sac 1 (10/07/21 1520)  Rupture Type: Spontaneous (10/07/21 1520)  Rupture Date: 10/07/21 (10/07/21 1520)  Rupture Time: 1230 (10/07/21 1520)  Fluid Color: Clear (10/07/21 1520)  Fluid Odor: Normal (10/07/21 1520)  Fluid Amount: Scant (10/07/21 1520)     Assessment & Plan     Gestational Age  39w5d IUP with SROM in labor with Pitocin augmentaiton  Anticipate YOHAN Reveles MD        toxic-appearing or diaphoretic. HENT:      Head: Normocephalic. Right Ear: Ear canal and external ear normal.      Left Ear: Ear canal and external ear normal.      Nose: Nose normal. No congestion or rhinorrhea. Mouth/Throat:      Mouth: Mucous membranes are moist.      Pharynx: Oropharynx is clear. No oropharyngeal exudate or posterior oropharyngeal erythema. Cardiovascular:      Rate and Rhythm: Normal rate. Pulses: Normal pulses. Pulmonary:      Effort: Pulmonary effort is normal. No respiratory distress. Breath sounds: No stridor. No wheezing or rhonchi. Abdominal:      General: Abdomen is flat. Bowel sounds are normal.      Palpations: Abdomen is soft. Musculoskeletal:         General: No swelling or tenderness. Normal range of motion. Cervical back: Normal range of motion. Back:    Neurological:      General: No focal deficit present. Mental Status: She is alert and oriented to person, place, and time.    Psychiatric:         Mood and Affect: Mood normal.         Behavior: Behavior normal.       DIAGNOSTIC RESULTS     Labs:  Results for orders placed or performed during the hospital encounter of 03/20/23   Urinalysis   Result Value Ref Range    Glucose, Ur Negative NEGATIVE mg/dl    Bilirubin Urine Negative NEGATIVE    Ketones, Urine Negative NEGATIVE    Specific Gravity, UA >=1.030 1.002 - 1.030    Blood, Urine Negative NEGATIVE    pH, UA 5.50 5.0 - 9.0    Protein, UA Negative NEGATIVE mg/dl    Urobilinogen, Urine 0.20 0.2 - 1.0 eu/dl    Nitrite, Urine Negative NEGATIVE    Leukocyte Esterase, Urine Small (A) NEGATIVE    Color, UA Yellow STRAW-YELLOW    Character, Urine Clear CLEAR-SL CLOUD       IMAGING:    No orders to display         EKG: None      URGENT CARE COURSE:     Vitals:    03/20/23 1323   BP: (!) 171/74   Pulse: 72   Resp: 16   Temp: 97.6 °F (36.4 °C)   SpO2: 97%       Medications - No data to display         PROCEDURES:  None    FINAL IMPRESSION

## 2023-03-20 NOTE — ED NOTES
To [facility] with coomplaints of right side back/rib pain down to hip and up side. No injury. Started Friday. States it hurts to move.       Liset Pham RN  03/20/23 [id number]

## 2023-03-20 NOTE — Clinical Note
Josue Soria was seen and treated in our emergency department on 3/20/2023. She may return to work on 03/22/2023. May be off school or work one to two days if needed. Should also be fever free 24 hours before returning to work or school. If you have any questions or concerns, please don't hesitate to call.       Yahaira Vela, APRN - CNP

## 2023-03-21 LAB
BACTERIA UR CULT: ABNORMAL
ORGANISM: ABNORMAL

## 2023-10-16 ENCOUNTER — HOSPITAL ENCOUNTER (EMERGENCY)
Age: 53
Discharge: HOME | End: 2023-10-16
Payer: COMMERCIAL

## 2023-10-16 VITALS
WEIGHT: 270 LBS | SYSTOLIC BLOOD PRESSURE: 164 MMHG | BODY MASS INDEX: 44.98 KG/M2 | RESPIRATION RATE: 18 BRPM | TEMPERATURE: 99.2 F | DIASTOLIC BLOOD PRESSURE: 95 MMHG | HEIGHT: 65 IN | OXYGEN SATURATION: 96 % | HEART RATE: 96 BPM

## 2023-10-16 DIAGNOSIS — K52.9 GASTROENTERITIS: ICD-10-CM

## 2023-10-16 DIAGNOSIS — J06.9 ACUTE UPPER RESPIRATORY INFECTION: Primary | ICD-10-CM

## 2023-10-16 LAB — SARS-COV-2 RDRP RESP QL NAA+PROBE: NOT  DETECTED

## 2023-10-16 PROCEDURE — 99213 OFFICE O/P EST LOW 20 MIN: CPT

## 2023-10-16 PROCEDURE — 87635 SARS-COV-2 COVID-19 AMP PRB: CPT

## 2023-10-16 RX ORDER — PREDNISONE 20 MG/1
20 TABLET ORAL 2 TIMES DAILY
Qty: 10 TABLET | Refills: 0 | Status: SHIPPED | OUTPATIENT
Start: 2023-10-16 | End: 2023-10-21

## 2023-10-16 RX ORDER — AMOXICILLIN AND CLAVULANATE POTASSIUM 500; 125 MG/1; MG/1
1 TABLET, FILM COATED ORAL 2 TIMES DAILY
Qty: 20 TABLET | Refills: 0 | Status: SHIPPED | OUTPATIENT
Start: 2023-10-16 | End: 2023-10-26

## 2023-10-16 ASSESSMENT — ENCOUNTER SYMPTOMS
SINUS PRESSURE: 1
COUGH: 1
DIARRHEA: 1

## 2023-10-16 ASSESSMENT — PAIN - FUNCTIONAL ASSESSMENT: PAIN_FUNCTIONAL_ASSESSMENT: NONE - DENIES PAIN

## 2023-10-16 NOTE — ED NOTES
Pt with complaints of a cough, nasal congestion, sinus pressure for one month and diarrhea that started today. States she has taken at home COVID tests which are negative. States she has tried decongestants but they have not helped. Denies any chest pain or shortness of breath.      Aliza Hermosillo, FIDELIA  73/15/02 8628

## 2024-04-11 ENCOUNTER — APPOINTMENT (OUTPATIENT)
Dept: GENERAL RADIOLOGY | Age: 54
End: 2024-04-11
Payer: COMMERCIAL

## 2024-04-11 ENCOUNTER — HOSPITAL ENCOUNTER (EMERGENCY)
Age: 54
Discharge: HOME | End: 2024-04-11
Attending: EMERGENCY MEDICINE
Payer: COMMERCIAL

## 2024-04-11 VITALS
RESPIRATION RATE: 18 BRPM | OXYGEN SATURATION: 96 % | WEIGHT: 275 LBS | TEMPERATURE: 98.7 F | SYSTOLIC BLOOD PRESSURE: 160 MMHG | HEART RATE: 78 BPM | BODY MASS INDEX: 45.82 KG/M2 | HEIGHT: 65 IN | DIASTOLIC BLOOD PRESSURE: 92 MMHG

## 2024-04-11 DIAGNOSIS — R07.9 CHEST PAIN, UNSPECIFIED TYPE: Primary | ICD-10-CM

## 2024-04-11 LAB
ALBUMIN SERPL BCG-MCNC: 4.2 G/DL (ref 3.5–5.1)
ALP SERPL-CCNC: 101 U/L (ref 38–126)
ALT SERPL W/O P-5'-P-CCNC: 35 U/L (ref 11–66)
ANION GAP SERPL CALC-SCNC: 15 MEQ/L (ref 8–16)
APTT PPP: 29 SECONDS (ref 22–38)
AST SERPL-CCNC: 22 U/L (ref 5–40)
BASOPHILS ABSOLUTE: 0 THOU/MM3 (ref 0–0.1)
BASOPHILS NFR BLD AUTO: 0.5 %
BILIRUB SERPL-MCNC: 0.3 MG/DL (ref 0.3–1.2)
BUN SERPL-MCNC: 24 MG/DL (ref 7–22)
CALCIUM SERPL-MCNC: 8.8 MG/DL (ref 8.5–10.5)
CHLORIDE SERPL-SCNC: 106 MEQ/L (ref 98–111)
CO2 SERPL-SCNC: 22 MEQ/L (ref 23–33)
CREAT SERPL-MCNC: 0.7 MG/DL (ref 0.4–1.2)
D DIMER PPP IA.FEU-MCNC: 320 NG/ML FEU (ref 0–500)
DEPRECATED RDW RBC AUTO: 43.8 FL (ref 35–45)
EOSINOPHIL NFR BLD AUTO: 2.3 %
EOSINOPHILS ABSOLUTE: 0.2 THOU/MM3 (ref 0–0.4)
ERYTHROCYTE [DISTWIDTH] IN BLOOD BY AUTOMATED COUNT: 14.3 % (ref 11.5–14.5)
FLUAV RNA RESP QL NAA+PROBE: NOT DETECTED
FLUBV RNA RESP QL NAA+PROBE: NOT DETECTED
GFR SERPL CREATININE-BSD FRML MDRD: > 90 ML/MIN/1.73M2
GLUCOSE SERPL-MCNC: 122 MG/DL (ref 70–108)
HCT VFR BLD AUTO: 40.3 % (ref 37–47)
HGB BLD-MCNC: 13.4 GM/DL (ref 12–16)
IMM GRANULOCYTES # BLD AUTO: 0.02 THOU/MM3 (ref 0–0.07)
IMM GRANULOCYTES NFR BLD AUTO: 0.3 %
INR PPP: 0.88 (ref 0.85–1.13)
LYMPHOCYTES ABSOLUTE: 2.7 THOU/MM3 (ref 1–4.8)
LYMPHOCYTES NFR BLD AUTO: 34.7 %
MAGNESIUM SERPL-MCNC: 1.8 MG/DL (ref 1.6–2.4)
MCH RBC QN AUTO: 28.2 PG (ref 26–33)
MCHC RBC AUTO-ENTMCNC: 33.3 GM/DL (ref 32.2–35.5)
MCV RBC AUTO: 84.7 FL (ref 81–99)
MONOCYTES ABSOLUTE: 0.6 THOU/MM3 (ref 0.4–1.3)
MONOCYTES NFR BLD AUTO: 7.3 %
NEUTROPHILS NFR BLD AUTO: 54.9 %
NRBC BLD AUTO-RTO: 0 /100 WBC
NT-PROBNP SERPL IA-MCNC: 74.7 PG/ML (ref 0–124)
OSMOLALITY SERPL CALC.SUM OF ELEC: 290.3 MOSMOL/KG (ref 275–300)
PLATELET # BLD AUTO: 333 THOU/MM3 (ref 130–400)
PMV BLD AUTO: 9.9 FL (ref 9.4–12.4)
POTASSIUM SERPL-SCNC: 4.1 MEQ/L (ref 3.5–5.2)
PROT SERPL-MCNC: 7.5 G/DL (ref 6.1–8)
RBC # BLD AUTO: 4.76 MILL/MM3 (ref 4.2–5.4)
REASON FOR REJECTION: NORMAL
REJECTED TEST: NORMAL
SARS-COV-2 RNA RESP QL NAA+PROBE: NOT DETECTED
SEGMENTED NEUTROPHILS ABSOLUTE COUNT: 4.2 THOU/MM3 (ref 1.8–7.7)
SODIUM SERPL-SCNC: 143 MEQ/L (ref 135–145)
TROPONIN, HIGH SENSITIVITY: < 6 NG/L (ref 0–12)
TSH SERPL DL<=0.005 MIU/L-ACNC: 3.41 UIU/ML (ref 0.4–4.2)
WBC # BLD AUTO: 7.7 THOU/MM3 (ref 4.8–10.8)

## 2024-04-11 PROCEDURE — 2580000003 HC RX 258: Performed by: STUDENT IN AN ORGANIZED HEALTH CARE EDUCATION/TRAINING PROGRAM

## 2024-04-11 PROCEDURE — 99285 EMERGENCY DEPT VISIT HI MDM: CPT

## 2024-04-11 PROCEDURE — 87636 SARSCOV2 & INF A&B AMP PRB: CPT

## 2024-04-11 PROCEDURE — 83880 ASSAY OF NATRIURETIC PEPTIDE: CPT

## 2024-04-11 PROCEDURE — 36415 COLL VENOUS BLD VENIPUNCTURE: CPT

## 2024-04-11 PROCEDURE — 85025 COMPLETE CBC W/AUTO DIFF WBC: CPT

## 2024-04-11 PROCEDURE — 85379 FIBRIN DEGRADATION QUANT: CPT

## 2024-04-11 PROCEDURE — 71046 X-RAY EXAM CHEST 2 VIEWS: CPT

## 2024-04-11 PROCEDURE — 85730 THROMBOPLASTIN TIME PARTIAL: CPT

## 2024-04-11 PROCEDURE — 84484 ASSAY OF TROPONIN QUANT: CPT

## 2024-04-11 PROCEDURE — 6370000000 HC RX 637 (ALT 250 FOR IP): Performed by: STUDENT IN AN ORGANIZED HEALTH CARE EDUCATION/TRAINING PROGRAM

## 2024-04-11 PROCEDURE — 80053 COMPREHEN METABOLIC PANEL: CPT

## 2024-04-11 PROCEDURE — 93005 ELECTROCARDIOGRAM TRACING: CPT | Performed by: STUDENT IN AN ORGANIZED HEALTH CARE EDUCATION/TRAINING PROGRAM

## 2024-04-11 PROCEDURE — 84443 ASSAY THYROID STIM HORMONE: CPT

## 2024-04-11 PROCEDURE — 83735 ASSAY OF MAGNESIUM: CPT

## 2024-04-11 PROCEDURE — 85610 PROTHROMBIN TIME: CPT

## 2024-04-11 RX ORDER — ACETAMINOPHEN 500 MG
1000 TABLET ORAL ONCE
Status: COMPLETED | OUTPATIENT
Start: 2024-04-11 | End: 2024-04-11

## 2024-04-11 RX ORDER — SODIUM CHLORIDE, SODIUM LACTATE, POTASSIUM CHLORIDE, AND CALCIUM CHLORIDE .6; .31; .03; .02 G/100ML; G/100ML; G/100ML; G/100ML
1000 INJECTION, SOLUTION INTRAVENOUS ONCE
Status: COMPLETED | OUTPATIENT
Start: 2024-04-11 | End: 2024-04-11

## 2024-04-11 RX ADMIN — ACETAMINOPHEN 1000 MG: 500 TABLET ORAL at 21:19

## 2024-04-11 RX ADMIN — SODIUM CHLORIDE, POTASSIUM CHLORIDE, SODIUM LACTATE AND CALCIUM CHLORIDE 1000 ML: 600; 310; 30; 20 INJECTION, SOLUTION INTRAVENOUS at 19:42

## 2024-04-11 ASSESSMENT — PAIN - FUNCTIONAL ASSESSMENT: PAIN_FUNCTIONAL_ASSESSMENT: 0-10

## 2024-04-11 ASSESSMENT — PAIN DESCRIPTION - LOCATION: LOCATION: CHEST

## 2024-04-11 ASSESSMENT — PAIN SCALES - GENERAL: PAINLEVEL_OUTOF10: 4

## 2024-04-11 ASSESSMENT — HEART SCORE: ECG: NORMAL

## 2024-04-11 NOTE — ED TRIAGE NOTES
Pt presents to the ED through lobby with c/o chest pain. Pt states pain started while she was walking at work. Pain is in the center of her chest and does not radiate anywhere. States pain is 4 out of 10 at this time. Denies dizziness. States she has been nauseous over the last few days but is not currently nauseous. RR unlabored. EKG complete

## 2024-04-11 NOTE — ED PROVIDER NOTES
grandfather.    SOCIAL HISTORY   Divine  reports that she has never smoked. She has never used smokeless tobacco. She reports that she does not drink alcohol and does not use drugs.    PHYSICAL EXAM     ED Triage Vitals   BP Temp Temp src Pulse Resp SpO2 Height Weight   -- -- -- -- -- -- -- --       Initial vital signs and nursing assessment reviewed and normal. Body mass index is 45.76 kg/m².     Vitals:    04/11/24 1840 04/11/24 1946 04/11/24 2054 04/11/24 2225   BP: (!) 164/97 (!) 156/79 (!) 158/71 (!) 160/92   Pulse: 93 97 97 78   Resp: 21 20 20 18   Temp: 98.7 °F (37.1 °C)      TempSrc: Oral      SpO2: 96%  97% 96%   Weight: 124.7 kg (275 lb)      Height: 1.651 m (5' 5\")          Physical Exam  Vitals and nursing note reviewed.   Constitutional:       General: She is not in acute distress.     Appearance: Normal appearance. She is obese. She is not ill-appearing, toxic-appearing or diaphoretic.   HENT:      Head: Normocephalic and atraumatic.      Nose: Nose normal.      Mouth/Throat:      Mouth: Mucous membranes are moist.      Pharynx: Oropharynx is clear.   Eyes:      Conjunctiva/sclera: Conjunctivae normal.   Cardiovascular:      Rate and Rhythm: Normal rate and regular rhythm.      Pulses: Normal pulses.      Heart sounds: Normal heart sounds.   Pulmonary:      Effort: Pulmonary effort is normal.      Breath sounds: Normal breath sounds.   Abdominal:      General: Abdomen is flat. Bowel sounds are normal.      Palpations: Abdomen is soft.      Tenderness: There is no abdominal tenderness.   Musculoskeletal:      Cervical back: Normal range of motion.   Skin:     General: Skin is warm and dry.      Capillary Refill: Capillary refill takes less than 2 seconds.   Neurological:      Mental Status: She is alert and oriented to person, place, and time.          FORMAL DIAGNOSTIC RESULTS   RADIOLOGY: Interpretation per the Radiologist below, if available at the time of this note (none if blank):  XR CHEST (2 VW)

## 2024-04-12 LAB
EKG ATRIAL RATE: 90 BPM
EKG P AXIS: 22 DEGREES
EKG P-R INTERVAL: 192 MS
EKG Q-T INTERVAL: 348 MS
EKG QRS DURATION: 78 MS
EKG QTC CALCULATION (BAZETT): 425 MS
EKG R AXIS: 15 DEGREES
EKG T AXIS: 25 DEGREES
EKG VENTRICULAR RATE: 90 BPM

## 2024-04-12 PROCEDURE — 93010 ELECTROCARDIOGRAM REPORT: CPT | Performed by: NUCLEAR MEDICINE

## 2024-04-12 NOTE — ED NOTES
ED nurse-to-nurse bedside report    Chief Complaint   Patient presents with    Chest Pain      LOC: alert and orientated to name, place, date  Vital signs   Vitals:    04/11/24 1840 04/11/24 1946 04/11/24 2054   BP: (!) 164/97 (!) 156/79 (!) 158/71   Pulse: 93 97 97   Resp: 21 20 20   Temp: 98.7 °F (37.1 °C)     TempSrc: Oral     SpO2: 96%  97%   Weight: 124.7 kg (275 lb)     Height: 1.651 m (5' 5\")        Pain:  4  Pain Interventions: none  Pain Goal: 2  Oxygen: No    Current needs required RA   Telemetry: Yes  LDAs: 20G LAC  Peripheral IV 04/11/24 Left Antecubital (Active)   Site Assessment Clean, dry & intact 04/11/24 2053   Line Status Normal saline locked 04/11/24 2053   Line Care Connections checked and tightened 04/11/24 2053   Phlebitis Assessment No symptoms 04/11/24 2053   Infiltration Assessment 0 04/11/24 2053   Dressing Status Clean, dry & intact 04/11/24 2053   Dressing Type Transparent 04/11/24 2053     Continuous Infusions: n/a  Mobility: Independent  Molina Fall Risk Score:        No data to display              Fall Interventions: frequent rounding  Report given to: Yarelis CAMARENA

## 2024-04-12 NOTE — DISCHARGE INSTRUCTIONS
You are seen today for chest heaviness and heaviness throughout your body.  Your lab work is reassuring.    Please follow-up as planned with Dr. Jameel Ellington on Tuesday.    If symptoms are worse or other new concerns please come back to the Emergency Department for re-evaluation.

## 2024-04-16 ENCOUNTER — OFFICE VISIT (OUTPATIENT)
Dept: FAMILY MEDICINE CLINIC | Age: 54
End: 2024-04-16
Payer: COMMERCIAL

## 2024-04-16 VITALS
SYSTOLIC BLOOD PRESSURE: 150 MMHG | TEMPERATURE: 98.3 F | HEART RATE: 69 BPM | WEIGHT: 275.6 LBS | HEIGHT: 65 IN | OXYGEN SATURATION: 95 % | DIASTOLIC BLOOD PRESSURE: 88 MMHG | RESPIRATION RATE: 21 BRPM | BODY MASS INDEX: 45.92 KG/M2

## 2024-04-16 DIAGNOSIS — I10 PRIMARY HYPERTENSION: Primary | ICD-10-CM

## 2024-04-16 DIAGNOSIS — D32.9 MENINGIOMA (HCC): ICD-10-CM

## 2024-04-16 DIAGNOSIS — R07.9 CHEST PAIN, UNSPECIFIED TYPE: ICD-10-CM

## 2024-04-16 DIAGNOSIS — R07.89 CHEST PRESSURE: ICD-10-CM

## 2024-04-16 PROCEDURE — 99204 OFFICE O/P NEW MOD 45 MIN: CPT | Performed by: STUDENT IN AN ORGANIZED HEALTH CARE EDUCATION/TRAINING PROGRAM

## 2024-04-16 PROCEDURE — 3079F DIAST BP 80-89 MM HG: CPT | Performed by: STUDENT IN AN ORGANIZED HEALTH CARE EDUCATION/TRAINING PROGRAM

## 2024-04-16 PROCEDURE — 3077F SYST BP >= 140 MM HG: CPT | Performed by: STUDENT IN AN ORGANIZED HEALTH CARE EDUCATION/TRAINING PROGRAM

## 2024-04-16 RX ORDER — LISINOPRIL 10 MG/1
10 TABLET ORAL DAILY
Qty: 30 TABLET | Refills: 2 | Status: SHIPPED | OUTPATIENT
Start: 2024-04-16

## 2024-04-16 SDOH — ECONOMIC STABILITY: INCOME INSECURITY: HOW HARD IS IT FOR YOU TO PAY FOR THE VERY BASICS LIKE FOOD, HOUSING, MEDICAL CARE, AND HEATING?: NOT VERY HARD

## 2024-04-16 SDOH — ECONOMIC STABILITY: HOUSING INSECURITY
IN THE LAST 12 MONTHS, WAS THERE A TIME WHEN YOU DID NOT HAVE A STEADY PLACE TO SLEEP OR SLEPT IN A SHELTER (INCLUDING NOW)?: NO

## 2024-04-16 SDOH — ECONOMIC STABILITY: FOOD INSECURITY: WITHIN THE PAST 12 MONTHS, YOU WORRIED THAT YOUR FOOD WOULD RUN OUT BEFORE YOU GOT MONEY TO BUY MORE.: NEVER TRUE

## 2024-04-16 SDOH — ECONOMIC STABILITY: FOOD INSECURITY: WITHIN THE PAST 12 MONTHS, THE FOOD YOU BOUGHT JUST DIDN'T LAST AND YOU DIDN'T HAVE MONEY TO GET MORE.: NEVER TRUE

## 2024-04-16 ASSESSMENT — PATIENT HEALTH QUESTIONNAIRE - PHQ9
2. FEELING DOWN, DEPRESSED OR HOPELESS: SEVERAL DAYS
SUM OF ALL RESPONSES TO PHQ QUESTIONS 1-9: 2
SUM OF ALL RESPONSES TO PHQ9 QUESTIONS 1 & 2: 2
SUM OF ALL RESPONSES TO PHQ QUESTIONS 1-9: 2
SUM OF ALL RESPONSES TO PHQ QUESTIONS 1-9: 2
1. LITTLE INTEREST OR PLEASURE IN DOING THINGS: SEVERAL DAYS
SUM OF ALL RESPONSES TO PHQ QUESTIONS 1-9: 2

## 2024-04-16 NOTE — PROGRESS NOTES
[facility] PROFESSIONAL [facility] MEDICINE PRACTICE  [address]  [facility] [address]  Dept: [phone]  Loc: [phone]      Divine Skinner (:  1970) is a 53 y.o. female,New patient, here for evaluation of the following chief complaint(s):  Follow-up (Patient states she no longer has the chest pain)      ASSESSMENT/PLAN:  1. Primary hypertension  -     lisinopril (PRINIVIL;ZESTRIL) 10 MG tablet; Take 1 tablet by mouth daily, Disp-30 tablet, R-2Normal  2. Chest pressure  -     Nuclear stress test with myocardial perfusion; Future  3. Chest pain, unspecified type  -     Nuclear stress test with myocardial perfusion; Future  4. Meningioma (HCC)  -     Linda Marie MD, Neurology, [facility]  5. Body mass index (BMI) 45.0-49.9, adult (HCC)    Chest pain and chest pressure  Obtain stress test, chemical stress test performed as patient cannot tolerate treadmill.  EKG shows normal sinus rhythm in the emergency department on 2024  Echo from 2021 reviewed showing normal ejection fraction of 55-60% and no wall motion abnormalities noted.  Holter monitor 48-hour from 2021 shows normal sinus rhythm with an average heart rate of 79 and rare PVCs with no pauses or significant arrhythmias.  Overall reported as a normal Holter.  Chest x-ray from 2024: No acute findings reported.  TSH normal at 3.41    Hypertension, new diagnosis with chronic elevations in BP. /88 in office.   Start lisinopril 10 mg with home BP readings with log, patient has arm cuff.   Labs reviewed from emergency department.  Renal function and electrolytes stable    History of meningioma, initially incidental finding on CT with history of headaches, lost to [id number] back to neurology for evaluation/management    Return in about 1 month (around 2024) for HTN and establish care.    SUBJECTIVE/OBJECTIVE:  HPI  Patient presents to the clinic as an ED follow-up for

## 2024-04-16 NOTE — PROGRESS NOTES
After pharmacist chart review, the following recommendations are made:    If BP is elevated on this visit, could consider adding ACE/ARB or CCB (e.g. lisinopril, losartan, or amlodipine).      For Pharmacy Admin Tracking Only    Program: Medical Group  CPA in place:  No  Recommendation Provided To: Provider: 1 via Note to Provider  Intervention Detail: New Rx: 1, reason: Needs Additional Therapy  Gap Closed?: No   Time Spent (min): 15

## 2024-04-16 NOTE — PROGRESS NOTES
S: 53 y.o. female with   Chief Complaint   Patient presents with    Follow-up     Patient states she no longer has the chest pain       HPI: please see resident note for HPI and ROS.    BP Readings from Last 3 Encounters:   04/16/24 (!) 150/88   04/11/24 (!) 160/92   10/16/23 (!) 164/95     Wt Readings from Last 3 Encounters:   04/16/24 125 kg (275 lb 9.6 oz)   04/11/24 124.7 kg (275 lb)   10/16/23 122.5 kg (270 lb)       O: VS:  height is 1.651 m (5' 5\") and weight is 125 kg (275 lb 9.6 oz). Her oral temperature is 98.3 °F (36.8 °C). Her blood pressure is 150/88 (abnormal) and her pulse is 69. Her respiration is 21 and oxygen saturation is 95%.   AAO/NAD, appropriate affect for mood       Diagnosis Orders   1. Primary hypertension  lisinopril (PRINIVIL;ZESTRIL) 10 MG tablet      2. Chest pressure  Nuclear stress test with myocardial perfusion      3. Chest pain, unspecified type  Nuclear stress test with myocardial perfusion      4. Meningioma (HCC)  Linda Marie MD, Neurology, [place]      5. Body mass index (BMI) 45.0-49.9, adult (HCC)            Plan:  Please refer to resident note for full plan.    53-year-old female here for ED follow up. Seen at [facility] ED on 4/11/2024 for chest pain. Labs significant for negative troponin, normal BNP, CMP, CBC, magnesium, TSH, and negative d-dimer. EKG non-acute. Discharged with PCP follow up. Complains of ongoing chest pain episodes. Agree with stress test to evaluate further. BP elevated today; has had chronic elevations. Agree with starting Lisinopril 10 mg daily with close BP follow up. History of meningioma; referral placed back to neurology for evaluation/management. Follow up in 4 weeks to establish care/follow up after cardiac testing.     Health Maintenance Due   Topic Date Due    Hepatitis B vaccine (1 of 3 - 3-dose series) Never done    COVID-19 Vaccine (1) Never done    HIV screen  Never done    Hepatitis C screen  Never done    Cervical cancer screen

## 2024-05-01 ENCOUNTER — HOSPITAL ENCOUNTER (OUTPATIENT)
Dept: NUCLEAR MEDICINE | Age: 54
Discharge: HOME | End: 2024-05-01
Payer: COMMERCIAL

## 2024-05-01 ENCOUNTER — HOSPITAL ENCOUNTER (OUTPATIENT)
Age: 54
Discharge: HOME | End: 2024-05-03
Payer: COMMERCIAL

## 2024-05-01 VITALS — BODY MASS INDEX: 45.82 KG/M2 | WEIGHT: 275 LBS | HEIGHT: 65 IN

## 2024-05-01 DIAGNOSIS — R07.9 CHEST PAIN, UNSPECIFIED TYPE: ICD-10-CM

## 2024-05-01 DIAGNOSIS — R07.89 CHEST PRESSURE: ICD-10-CM

## 2024-05-01 LAB
ECHO BSA: 2.39 M2
NUC STRESS EJECTION FRACTION: 72 %
STRESS BASELINE DIAS BP: 72 MMHG
STRESS BASELINE HR: 74 BPM
STRESS BASELINE ST DEPRESSION: 0 MM
STRESS BASELINE SYS BP: 135 MMHG
STRESS ST DEPRESSION: 0 MM
STRESS STAGE 1 BP: NORMAL MMHG
STRESS STAGE 1 DURATION: 1 MIN:SEC
STRESS STAGE 1 HR: 81 BPM
STRESS STAGE 2 BP: NORMAL MMHG
STRESS STAGE 2 DURATION: 1 MIN:SEC
STRESS STAGE 2 HR: 90 BPM
STRESS STAGE 3 BP: NORMAL MMHG
STRESS STAGE 3 DURATION: 1 MIN:SEC
STRESS STAGE 3 HR: 88 BPM
STRESS STAGE RECOVERY 1 BP: NORMAL MMHG
STRESS STAGE RECOVERY 1 DURATION: 1 MIN:SEC
STRESS STAGE RECOVERY 1 HR: 82 BPM
STRESS STAGE RECOVERY 2 BP: NORMAL MMHG
STRESS STAGE RECOVERY 2 DURATION: 1 MIN:SEC
STRESS STAGE RECOVERY 2 HR: 80 BPM
STRESS STAGE RECOVERY 3 BP: NORMAL MMHG
STRESS STAGE RECOVERY 3 DURATION: 1 MIN:SEC
STRESS STAGE RECOVERY 3 HR: 85 BPM
STRESS STAGE RECOVERY 4 BP: NORMAL MMHG
STRESS STAGE RECOVERY 4 DURATION: 2 MIN:SEC
STRESS STAGE RECOVERY 4 HR: 83 BPM
STRESS TARGET HR: 168 BPM
TID: 1.14

## 2024-05-01 PROCEDURE — 6360000002 HC RX W HCPCS: Performed by: INTERNAL MEDICINE

## 2024-05-01 PROCEDURE — 3430000000 HC RX DIAGNOSTIC RADIOPHARMACEUTICAL: Performed by: STUDENT IN AN ORGANIZED HEALTH CARE EDUCATION/TRAINING PROGRAM

## 2024-05-01 PROCEDURE — 93017 CV STRESS TEST TRACING ONLY: CPT

## 2024-05-01 PROCEDURE — A9500 TC99M SESTAMIBI: HCPCS | Performed by: STUDENT IN AN ORGANIZED HEALTH CARE EDUCATION/TRAINING PROGRAM

## 2024-05-01 PROCEDURE — 78452 HT MUSCLE IMAGE SPECT MULT: CPT

## 2024-05-01 RX ORDER — TETRAKIS(2-METHOXYISOBUTYLISOCYANIDE)COPPER(I) TETRAFLUOROBORATE 1 MG/ML
33.6 INJECTION, POWDER, LYOPHILIZED, FOR SOLUTION INTRAVENOUS
Status: COMPLETED | OUTPATIENT
Start: 2024-05-01 | End: 2024-05-01

## 2024-05-01 RX ORDER — REGADENOSON 0.08 MG/ML
0.4 INJECTION, SOLUTION INTRAVENOUS
Status: COMPLETED | OUTPATIENT
Start: 2024-05-01 | End: 2024-05-01

## 2024-05-01 RX ORDER — TETRAKIS(2-METHOXYISOBUTYLISOCYANIDE)COPPER(I) TETRAFLUOROBORATE 1 MG/ML
9.4 INJECTION, POWDER, LYOPHILIZED, FOR SOLUTION INTRAVENOUS
Status: COMPLETED | OUTPATIENT
Start: 2024-05-01 | End: 2024-05-01

## 2024-05-01 RX ADMIN — REGADENOSON 0.4 MG: 0.08 INJECTION, SOLUTION INTRAVENOUS at 08:13

## 2024-05-01 RX ADMIN — Medication 33.6 MILLICURIE: at 08:15

## 2024-05-01 RX ADMIN — Medication 9.4 MILLICURIE: at 07:14

## 2024-05-02 ENCOUNTER — TELEPHONE (OUTPATIENT)
Dept: CARDIOLOGY CLINIC | Age: 54
End: 2024-05-02

## 2024-05-02 ENCOUNTER — TELEPHONE (OUTPATIENT)
Dept: FAMILY MEDICINE CLINIC | Age: 54
End: 2024-05-02

## 2024-05-02 DIAGNOSIS — R94.39 ABNORMAL STRESS TEST: Primary | ICD-10-CM

## 2024-05-02 DIAGNOSIS — I25.9 CHEST PAIN DUE TO MYOCARDIAL ISCHEMIA, UNSPECIFIED ISCHEMIC CHEST PAIN TYPE: ICD-10-CM

## 2024-05-02 NOTE — TELEPHONE ENCOUNTER
Pt had stress test done 5-1-24 that was ordered by Dr. Ellington.  Stress test is abnormal.    Dr. Ellington would you like to follow up with pt or would you like cardiology to see pt?

## 2024-05-02 NOTE — TELEPHONE ENCOUNTER
----- Message from Manpreet Ellington MD sent at 5/2/2024 11:34 AM EDT -----  Received message earlier about abnormal results. Urgent referral to cardiology placed for mild to moderate myocardial ischemia.  Thank you,  Electronically signed by Manpreet Ellington MD on 5/2/2024 at 11:33 AM

## 2024-05-02 NOTE — TELEPHONE ENCOUNTER
Positive stress, urgent cardiology referral ordered, please call cardiology to schedule.   Stress test report: suggestive mild to moderate myocardial ischemia in the anterior and inferior walls.  Thank you,  Electronically signed by Manpreet Ellington MD on 5/2/2024 at 8:37 AM

## 2024-05-18 ENCOUNTER — APPOINTMENT (OUTPATIENT)
Dept: CT IMAGING | Age: 54
End: 2024-05-18
Payer: COMMERCIAL

## 2024-05-18 ENCOUNTER — HOSPITAL ENCOUNTER (EMERGENCY)
Age: 54
Discharge: HOME | End: 2024-05-18
Payer: COMMERCIAL

## 2024-05-18 VITALS
TEMPERATURE: 98 F | BODY MASS INDEX: 45.82 KG/M2 | HEART RATE: 82 BPM | WEIGHT: 275 LBS | HEIGHT: 65 IN | RESPIRATION RATE: 18 BRPM | OXYGEN SATURATION: 97 % | SYSTOLIC BLOOD PRESSURE: 162 MMHG | DIASTOLIC BLOOD PRESSURE: 74 MMHG

## 2024-05-18 DIAGNOSIS — K80.80 BILIARY CALCULUS OF OTHER SITE WITHOUT OBSTRUCTION: ICD-10-CM

## 2024-05-18 DIAGNOSIS — R10.11 RIGHT UPPER QUADRANT ABDOMINAL PAIN: Primary | ICD-10-CM

## 2024-05-18 LAB
ALBUMIN SERPL BCG-MCNC: 4.1 G/DL (ref 3.5–5.1)
ALP SERPL-CCNC: 93 U/L (ref 38–126)
ALT SERPL W/O P-5'-P-CCNC: 68 U/L (ref 11–66)
ANION GAP SERPL CALC-SCNC: 10 MEQ/L (ref 8–16)
AST SERPL-CCNC: 39 U/L (ref 5–40)
B-HCG SERPL QL: NEGATIVE
BASOPHILS ABSOLUTE: 0 THOU/MM3 (ref 0–0.1)
BASOPHILS NFR BLD AUTO: 0.6 %
BILIRUB CONJ SERPL-MCNC: < 0.2 MG/DL (ref 0–0.3)
BILIRUB SERPL-MCNC: 0.3 MG/DL (ref 0.3–1.2)
BUN SERPL-MCNC: 16 MG/DL (ref 7–22)
CALCIUM SERPL-MCNC: 9 MG/DL (ref 8.5–10.5)
CHLORIDE SERPL-SCNC: 109 MEQ/L (ref 98–111)
CO2 SERPL-SCNC: 26 MEQ/L (ref 23–33)
CREAT SERPL-MCNC: 0.7 MG/DL (ref 0.4–1.2)
DEPRECATED RDW RBC AUTO: 44.2 FL (ref 35–45)
EKG ATRIAL RATE: 67 BPM
EKG P AXIS: 46 DEGREES
EKG P-R INTERVAL: 202 MS
EKG Q-T INTERVAL: 396 MS
EKG QRS DURATION: 94 MS
EKG QTC CALCULATION (BAZETT): 418 MS
EKG R AXIS: 41 DEGREES
EKG T AXIS: 40 DEGREES
EKG VENTRICULAR RATE: 67 BPM
EOSINOPHIL NFR BLD AUTO: 2.5 %
EOSINOPHILS ABSOLUTE: 0.1 THOU/MM3 (ref 0–0.4)
ERYTHROCYTE [DISTWIDTH] IN BLOOD BY AUTOMATED COUNT: 14.3 % (ref 11.5–14.5)
GFR SERPL CREATININE-BSD FRML MDRD: > 90 ML/MIN/1.73M2
GLUCOSE SERPL-MCNC: 101 MG/DL (ref 70–108)
HCT VFR BLD AUTO: 42.2 % (ref 37–47)
HGB BLD-MCNC: 13.7 GM/DL (ref 12–16)
IMM GRANULOCYTES # BLD AUTO: 0.01 THOU/MM3 (ref 0–0.07)
IMM GRANULOCYTES NFR BLD AUTO: 0.2 %
LIPASE SERPL-CCNC: 18.3 U/L (ref 5.6–51.3)
LYMPHOCYTES ABSOLUTE: 2 THOU/MM3 (ref 1–4.8)
LYMPHOCYTES NFR BLD AUTO: 37.7 %
MAGNESIUM SERPL-MCNC: 2 MG/DL (ref 1.6–2.4)
MCH RBC QN AUTO: 28 PG (ref 26–33)
MCHC RBC AUTO-ENTMCNC: 32.5 GM/DL (ref 32.2–35.5)
MCV RBC AUTO: 86.3 FL (ref 81–99)
MONOCYTES ABSOLUTE: 0.5 THOU/MM3 (ref 0.4–1.3)
MONOCYTES NFR BLD AUTO: 9.2 %
NEUTROPHILS ABSOLUTE: 2.6 THOU/MM3 (ref 1.8–7.7)
NEUTROPHILS NFR BLD AUTO: 49.8 %
NRBC BLD AUTO-RTO: 0 /100 WBC
OSMOLALITY SERPL CALC.SUM OF ELEC: 290 MOSMOL/KG (ref 275–300)
PLATELET # BLD AUTO: 324 THOU/MM3 (ref 130–400)
PMV BLD AUTO: 9.5 FL (ref 9.4–12.4)
POTASSIUM SERPL-SCNC: 4.6 MEQ/L (ref 3.5–5.2)
PROT SERPL-MCNC: 7.3 G/DL (ref 6.1–8)
RBC # BLD AUTO: 4.89 MILL/MM3 (ref 4.2–5.4)
SODIUM SERPL-SCNC: 145 MEQ/L (ref 135–145)
TROPONIN, HIGH SENSITIVITY: < 6 NG/L (ref 0–12)
WBC # BLD AUTO: 5.2 THOU/MM3 (ref 4.8–10.8)

## 2024-05-18 PROCEDURE — 84484 ASSAY OF TROPONIN QUANT: CPT

## 2024-05-18 PROCEDURE — 82248 BILIRUBIN DIRECT: CPT

## 2024-05-18 PROCEDURE — 84703 CHORIONIC GONADOTROPIN ASSAY: CPT

## 2024-05-18 PROCEDURE — 6360000004 HC RX CONTRAST MEDICATION: Performed by: PHYSICIAN ASSISTANT

## 2024-05-18 PROCEDURE — 93010 ELECTROCARDIOGRAM REPORT: CPT | Performed by: INTERNAL MEDICINE

## 2024-05-18 PROCEDURE — 83690 ASSAY OF LIPASE: CPT

## 2024-05-18 PROCEDURE — 74177 CT ABD & PELVIS W/CONTRAST: CPT

## 2024-05-18 PROCEDURE — 83735 ASSAY OF MAGNESIUM: CPT

## 2024-05-18 PROCEDURE — 85025 COMPLETE CBC W/AUTO DIFF WBC: CPT

## 2024-05-18 PROCEDURE — 93005 ELECTROCARDIOGRAM TRACING: CPT | Performed by: PHYSICIAN ASSISTANT

## 2024-05-18 PROCEDURE — 36415 COLL VENOUS BLD VENIPUNCTURE: CPT

## 2024-05-18 PROCEDURE — 99214 OFFICE O/P EST MOD 30 MIN: CPT | Performed by: NURSE PRACTITIONER

## 2024-05-18 PROCEDURE — 99285 EMERGENCY DEPT VISIT HI MDM: CPT

## 2024-05-18 PROCEDURE — 80053 COMPREHEN METABOLIC PANEL: CPT

## 2024-05-18 RX ORDER — ONDANSETRON 4 MG/1
4 TABLET, ORALLY DISINTEGRATING ORAL 3 TIMES DAILY PRN
Qty: 21 TABLET | Refills: 0 | Status: SHIPPED | OUTPATIENT
Start: 2024-05-18

## 2024-05-18 RX ORDER — HYDROCODONE BITARTRATE AND ACETAMINOPHEN 5; 325 MG/1; MG/1
1 TABLET ORAL EVERY 6 HOURS PRN
Qty: 12 TABLET | Refills: 0 | Status: SHIPPED | OUTPATIENT
Start: 2024-05-18 | End: 2024-05-21

## 2024-05-18 RX ADMIN — IOPAMIDOL 80 ML: 755 INJECTION, SOLUTION INTRAVENOUS at 13:03

## 2024-05-18 ASSESSMENT — PAIN - FUNCTIONAL ASSESSMENT
PAIN_FUNCTIONAL_ASSESSMENT: 0-10
PAIN_FUNCTIONAL_ASSESSMENT: 0-10

## 2024-05-18 ASSESSMENT — ENCOUNTER SYMPTOMS
NAUSEA: 1
DIARRHEA: 1
VOMITING: 0
BLOOD IN STOOL: 0
ABDOMINAL DISTENTION: 1
ABDOMINAL PAIN: 1
COUGH: 0

## 2024-05-18 ASSESSMENT — PAIN SCALES - GENERAL
PAINLEVEL_OUTOF10: 2
PAINLEVEL_OUTOF10: 2

## 2024-05-18 ASSESSMENT — PAIN DESCRIPTION - LOCATION
LOCATION: ABDOMEN
LOCATION: ABDOMEN

## 2024-05-18 ASSESSMENT — PAIN DESCRIPTION - ORIENTATION: ORIENTATION: RIGHT

## 2024-05-18 NOTE — ED TRIAGE NOTES
Pt presents to the ED from  for abdominal pain since 4/15. Pt denies taking medication for the pain today.

## 2024-05-18 NOTE — ED TRIAGE NOTES
Has pain right side of abdomen around to back pain , with bloating,soft stools,  eating crackers for the last week, watery diarrhea since 0100 this morning

## 2024-05-18 NOTE — ED PROVIDER NOTES
[facility] EMERGENCY DEPT  UrgentCare Encounter      CHIEFCOMPLAINT       Chief Complaint   Patient presents with    Abdominal Pain       Nurses Notes reviewed and I agree except as noted in the HPI.  HISTORY OF PRESENT ILLNESS     Divine Skinner is a 53 y.o. female who presents to the urgent care for evaluation of right   The history is provided by the patient.   Abdominal Pain  Pain location:  RUQ  Pain radiates to:  Back  Duration: Started April 15, 2024, pain was intermittent at that time now it is constant. and patient states that she \"cannot take it anymore\"  Ineffective treatments: GasX, omperazole, tylenol, anti -diarrhea.  Associated symptoms: diarrhea (watery, started today) and nausea    Associated symptoms: no chest pain, no cough, no dysuria and no vomiting    Risk factors comment:  She was told sometime within the last 8 years that she has gallstones.    The patient/patient representative has no other acute complaints at this time.    REVIEW OF SYSTEMS     Review of Systems   Constitutional:  Positive for appetite change (decreased). Negative for activity change.   Respiratory:  Negative for cough.    Cardiovascular:  Negative for chest pain.   Gastrointestinal:  Positive for abdominal distention, abdominal pain, diarrhea (watery, started today) and nausea. Negative for blood in stool and vomiting.   Genitourinary:  Negative for dysuria, frequency and urgency.       PAST MEDICAL HISTORY         Diagnosis Date    Gall stones     Hypertension        SURGICAL HISTORY     Patient  has a past surgical history that includes Tubal ligation and Nasal fracture surgery.    CURRENT MEDICATIONS       Previous Medications    LISINOPRIL (PRINIVIL;ZESTRIL) 10 MG TABLET    Take 1 tablet by mouth daily    OMEPRAZOLE PO    Take by mouth       ALLERGIES     Patient is is allergic to sertraline.    FAMILY HISTORY     Patient'sfamily history includes Dementia in her paternal grandfather; Diabetes in her maternal

## 2024-05-18 NOTE — ED PROVIDER NOTES
[facility] EMERGENCY DEPT      EMERGENCY MEDICINE     Pt Name: Divine Skinner  MRN: [id number]  Birthdate 1970  Date of evaluation: 2024  Provider: JACQUELINE Keane    CHIEF COMPLAINT       Chief Complaint   Patient presents with    Abdominal Pain     HISTORY OF PRESENT ILLNESS   Divine Skinner is a pleasant 53 y.o. female who presents to the emergency department from from home, by private vehicle for evaluation of complains of right upper quadrant pain has been going off and on for over a month.  She stated, waxes and wanes.       She has been nauseated and has diarrhea.  She has not been seen for this.  She actually seen urgent care today and was sent here for further evaluation.      PASTMEDICAL HISTORY     Past Medical History:   Diagnosis Date    Gall stones     Hypertension        Patient Active Problem List   Diagnosis Code    Viral upper respiratory tract infection J06.9    Meningioma (HCC) D32.9     SURGICAL HISTORY       Past Surgical History:   Procedure Laterality Date    NASAL FRACTURE SURGERY      TUBAL LIGATION         CURRENT MEDICATIONS       Discharge Medication List as of 2024  2:44 PM        CONTINUE these medications which have NOT CHANGED    Details   OMEPRAZOLE PO Take by mouthHistorical Med      lisinopril (PRINIVIL;ZESTRIL) 10 MG tablet Take 1 tablet by mouth daily, Disp-30 tablet, R-2Normal             ALLERGIES     is allergic to sertraline.    FAMILY HISTORY     She indicated that her mother is . She indicated that her father is alive. She indicated that her maternal grandmother is . She indicated that her maternal grandfather is . She indicated that her paternal grandfather is .       SOCIAL HISTORY       Social History     Tobacco Use    Smoking status: Never    Smokeless tobacco: Never   Vaping Use    Vaping Use: Never used   Substance Use Topics    Alcohol use: No    Drug use: No       PHYSICAL EXAM       ED Triage Vitals   BP Temp  as needed for Pain for up to 3 days. Intended supply: 3 days. Take lowest dose possible to manage pain Max Daily Amount: 4 tablets, Disp-12 tablet, R-0Normal             FINAL IMPRESSION      1. Right upper quadrant abdominal pain    2. Biliary calculus of other site without obstruction          DISPOSITION/PLAN   DISPOSITION Decision To Discharge 05/18/2024 02:41:19 PM      OUTPATIENT FOLLOW UP THE PATIENT:  Herman Garcia MD  [address] [address]  [phone]    On 5/21/2024  Go to Dr Garcia's office at 0830 am on Tuesday      JACQUELINE Keane Jeremy R, PA  05/18/24 [id number]

## 2024-05-21 ENCOUNTER — TELEPHONE (OUTPATIENT)
Dept: CARDIOLOGY CLINIC | Age: 54
End: 2024-05-21

## 2024-06-04 ENCOUNTER — OFFICE VISIT (OUTPATIENT)
Dept: CARDIOLOGY CLINIC | Age: 54
End: 2024-06-04
Payer: COMMERCIAL

## 2024-06-04 VITALS
BODY MASS INDEX: 45.12 KG/M2 | SYSTOLIC BLOOD PRESSURE: 139 MMHG | HEART RATE: 79 BPM | DIASTOLIC BLOOD PRESSURE: 81 MMHG | HEIGHT: 65 IN | WEIGHT: 270.8 LBS

## 2024-06-04 DIAGNOSIS — Z01.810 PREOP CARDIOVASCULAR EXAM: Primary | ICD-10-CM

## 2024-06-04 PROCEDURE — 99214 OFFICE O/P EST MOD 30 MIN: CPT | Performed by: INTERNAL MEDICINE

## 2024-06-04 NOTE — PROGRESS NOTES
New patient    Pre-op exam for Lap-zenia with Dr. Garcia on 6-.    Abnormal stress test.    C/o occasional sob, palpitations, dizziness, lightheaded and SARI.      
09/23/2017 02:26 PM     05/18/2024 12:19 PM    MPV 9.5 05/18/2024 12:19 PM       Lab Results   Component Value Date/Time     05/18/2024 12:19 PM    K 4.6 05/18/2024 12:19 PM    K 3.9 03/25/2021 07:30 AM     05/18/2024 12:19 PM    CO2 26 05/18/2024 12:19 PM    BUN 16 05/18/2024 12:19 PM    CREATININE 0.7 05/18/2024 12:19 PM    CALCIUM 9.0 05/18/2024 12:19 PM    LABGLOM > 90 05/18/2024 12:19 PM    LABGLOM >90 04/11/2024 07:00 PM    GLUCOSE 101 05/18/2024 12:19 PM       Lab Results   Component Value Date/Time    ALKPHOS 93 05/18/2024 12:19 PM    ALT 68 05/18/2024 12:19 PM    AST 39 05/18/2024 12:19 PM    BILITOT 0.3 05/18/2024 12:19 PM    BILIDIR <0.2 05/18/2024 12:19 PM       Lab Results   Component Value Date/Time    MG 2.0 05/18/2024 12:19 PM       Lab Results   Component Value Date    INR 0.88 04/11/2024    INR 1.00 04/11/2019         Lab Results   Component Value Date/Time    LABA1C 5.3 06/10/2019 02:55 PM       Lab Results   Component Value Date/Time    TRIG 34 06/10/2019 02:55 PM    HDL 69 02/18/2021 09:29 AM       Lab Results   Component Value Date/Time    TSH 3.410 04/11/2024 07:00 PM         Testing Reviewed:      I haveindividually reviewed the below cardiac tests    EKG:    ECHO: No results found for this or any previous visit.      STRESS:    CATH:    Assessment/Plan       Diagnosis Orders   1. Preop cardiovascular exam          Preop lap zenia  Abnormal stress test ?attenuation artifiact  atypical chest pains  BMI 46      Patient needs preop for lap zenia  Her symptoms are mostly due to headaches,   Denies anginal symptoms  Excellent exercise tolerance  Discussed LHC, vs coronary CTA to evaluate further  Patient wants to proceed with CTA coronary  Advised patient to call office or seek immediate medical attention if there is any new onset of  any chest pain, sob, palpitations, lightheadedness, dizziness, orthopnea, PND or pedal edema.   All medication side effects were discussed in

## 2024-06-07 ENCOUNTER — HOSPITAL ENCOUNTER (OUTPATIENT)
Dept: CT IMAGING | Age: 54
Discharge: HOME | End: 2024-06-07
Payer: COMMERCIAL

## 2024-06-07 VITALS
RESPIRATION RATE: 16 BRPM | OXYGEN SATURATION: 95 % | SYSTOLIC BLOOD PRESSURE: 137 MMHG | DIASTOLIC BLOOD PRESSURE: 69 MMHG | HEART RATE: 57 BPM

## 2024-06-07 DIAGNOSIS — Z01.810 PREOP CARDIOVASCULAR EXAM: ICD-10-CM

## 2024-06-07 PROCEDURE — 75574 CT ANGIO HRT W/3D IMAGE: CPT

## 2024-06-07 PROCEDURE — 6370000000 HC RX 637 (ALT 250 FOR IP): Performed by: RADIOLOGY

## 2024-06-07 PROCEDURE — 6360000004 HC RX CONTRAST MEDICATION: Performed by: INTERNAL MEDICINE

## 2024-06-07 RX ORDER — SODIUM CHLORIDE 9 MG/ML
INJECTION, SOLUTION INTRAVENOUS CONTINUOUS
Status: DISCONTINUED | OUTPATIENT
Start: 2024-06-07 | End: 2024-06-08 | Stop reason: HOSPADM

## 2024-06-07 RX ORDER — NITROGLYCERIN 0.4 MG/1
0.4 TABLET SUBLINGUAL ONCE
Status: COMPLETED | OUTPATIENT
Start: 2024-06-07 | End: 2024-06-07

## 2024-06-07 RX ORDER — METOPROLOL TARTRATE 1 MG/ML
5 INJECTION, SOLUTION INTRAVENOUS EVERY 5 MIN PRN
Status: DISCONTINUED | OUTPATIENT
Start: 2024-06-07 | End: 2024-06-08 | Stop reason: HOSPADM

## 2024-06-07 RX ADMIN — IOPAMIDOL 80 ML: 755 INJECTION, SOLUTION INTRAVENOUS at 10:29

## 2024-06-07 RX ADMIN — NITROGLYCERIN 0.4 MG: 0.4 TABLET, ORALLY DISINTEGRATING SUBLINGUAL at 10:40

## 2024-06-10 ENCOUNTER — TELEPHONE (OUTPATIENT)
Dept: CARDIOLOGY CLINIC | Age: 54
End: 2024-06-10

## 2024-06-11 NOTE — FLOWSHEET NOTE
Follow all instructions given by your physician  Do not eat or drink anything after midnight prior to surgery(includes water, chewing gum, mints and ice chips)  Sips of water am of surgery with allowed medications  May brush teeth   Do not smoke or chew tobacco, drink alcoholic beverages or use any illicit drugs for 24 hours prior to surgery  Bring insurance info and photo ID  Bring pertinent paperwork with you from Doctor or surgeons's office  Wear clean comfortable, loose-fitting clothing  No make-up, nail polish, jewelry, piercings, or contact lenses to be worn day of surgery  No glue on dentures morning of surgery; you will be asked to remove them for surgery. Case for glasses.  Shower the night before and the morning of surgery with cleansing soap provided or a liquid antibacterial soap, dry with new fresh clean towel after each shower, no lotions, creams or powder.  Clean sheets and pillowcase on bed night before surgery  Bring medications in original bottles, Bring rescue inhalers with you  Bring CPAP/BIPAP machine if you have one ( you may be charged if one is needed in recovery room )no pacemaker no   Do you have a DNR? FULL CODE  Please Bring Healthcare Directive or Healthcare Power of  in so we can scan it into your chart.    Our pharmacy has a Meds to Beds program where they will deliver any new prescriptions you may have to your room before you leave. Our Pharmacy will clear it through your insurance; for example (same co pay). This enables you to take your new RX as soon as you need when you get home and avoids stop/wait delays on the way home.  Please have a form of payment with you and have someone designated as your Pharmacy contact with their phone # as you may not feel well or still be under the influence of anesthesia.    Please refer to the SSI-Surgical Site Infection Flyer you hopefully received in the mail-together we can prevent infections; signs and symptoms reviewed.  When

## 2024-06-17 ENCOUNTER — ANESTHESIA (OUTPATIENT)
Dept: OPERATING ROOM | Age: 54
End: 2024-06-17
Payer: COMMERCIAL

## 2024-06-17 ENCOUNTER — ANESTHESIA EVENT (OUTPATIENT)
Dept: OPERATING ROOM | Age: 54
End: 2024-06-17
Payer: COMMERCIAL

## 2024-06-17 ENCOUNTER — HOSPITAL ENCOUNTER (OUTPATIENT)
Age: 54
Setting detail: OUTPATIENT SURGERY
Discharge: HOME | End: 2024-06-17
Attending: SURGERY | Admitting: SURGERY
Payer: COMMERCIAL

## 2024-06-17 VITALS
TEMPERATURE: 96.8 F | WEIGHT: 276 LBS | HEIGHT: 65 IN | DIASTOLIC BLOOD PRESSURE: 61 MMHG | SYSTOLIC BLOOD PRESSURE: 133 MMHG | BODY MASS INDEX: 45.98 KG/M2 | RESPIRATION RATE: 18 BRPM | HEART RATE: 64 BPM | OXYGEN SATURATION: 92 %

## 2024-06-17 DIAGNOSIS — K80.20 GALLSTONES: Primary | ICD-10-CM

## 2024-06-17 PROCEDURE — 3600000013 HC SURGERY LEVEL 3 ADDTL 15MIN: Performed by: SURGERY

## 2024-06-17 PROCEDURE — 2580000003 HC RX 258: Performed by: SURGERY

## 2024-06-17 PROCEDURE — 7100000000 HC PACU RECOVERY - FIRST 15 MIN: Performed by: SURGERY

## 2024-06-17 PROCEDURE — 7100000011 HC PHASE II RECOVERY - ADDTL 15 MIN: Performed by: SURGERY

## 2024-06-17 PROCEDURE — 3600000003 HC SURGERY LEVEL 3 BASE: Performed by: SURGERY

## 2024-06-17 PROCEDURE — 3700000001 HC ADD 15 MINUTES (ANESTHESIA): Performed by: SURGERY

## 2024-06-17 PROCEDURE — 6360000002 HC RX W HCPCS: Performed by: SURGERY

## 2024-06-17 PROCEDURE — 7100000010 HC PHASE II RECOVERY - FIRST 15 MIN: Performed by: SURGERY

## 2024-06-17 PROCEDURE — 7100000001 HC PACU RECOVERY - ADDTL 15 MIN: Performed by: SURGERY

## 2024-06-17 PROCEDURE — 6370000000 HC RX 637 (ALT 250 FOR IP): Performed by: SURGERY

## 2024-06-17 PROCEDURE — 2709999900 HC NON-CHARGEABLE SUPPLY: Performed by: SURGERY

## 2024-06-17 PROCEDURE — 6360000002 HC RX W HCPCS: Performed by: NURSE ANESTHETIST, CERTIFIED REGISTERED

## 2024-06-17 PROCEDURE — 3700000000 HC ANESTHESIA ATTENDED CARE: Performed by: SURGERY

## 2024-06-17 PROCEDURE — 2500000003 HC RX 250 WO HCPCS: Performed by: NURSE ANESTHETIST, CERTIFIED REGISTERED

## 2024-06-17 PROCEDURE — 88304 TISSUE EXAM BY PATHOLOGIST: CPT

## 2024-06-17 RX ORDER — SODIUM CHLORIDE 9 MG/ML
INJECTION, SOLUTION INTRAVENOUS PRN
Status: DISCONTINUED | OUTPATIENT
Start: 2024-06-17 | End: 2024-06-17 | Stop reason: HOSPADM

## 2024-06-17 RX ORDER — LIDOCAINE HYDROCHLORIDE 20 MG/ML
INJECTION, SOLUTION INFILTRATION; PERINEURAL PRN
Status: DISCONTINUED | OUTPATIENT
Start: 2024-06-17 | End: 2024-06-17 | Stop reason: SDUPTHER

## 2024-06-17 RX ORDER — PROPOFOL 10 MG/ML
INJECTION, EMULSION INTRAVENOUS PRN
Status: DISCONTINUED | OUTPATIENT
Start: 2024-06-17 | End: 2024-06-17 | Stop reason: SDUPTHER

## 2024-06-17 RX ORDER — BUPIVACAINE HYDROCHLORIDE 5 MG/ML
INJECTION, SOLUTION EPIDURAL; INTRACAUDAL PRN
Status: DISCONTINUED | OUTPATIENT
Start: 2024-06-17 | End: 2024-06-17 | Stop reason: ALTCHOICE

## 2024-06-17 RX ORDER — SODIUM CHLORIDE 9 MG/ML
INJECTION, SOLUTION INTRAVENOUS CONTINUOUS
Status: DISCONTINUED | OUTPATIENT
Start: 2024-06-17 | End: 2024-06-17 | Stop reason: HOSPADM

## 2024-06-17 RX ORDER — SODIUM CHLORIDE 0.9 % (FLUSH) 0.9 %
5-40 SYRINGE (ML) INJECTION EVERY 12 HOURS SCHEDULED
Status: DISCONTINUED | OUTPATIENT
Start: 2024-06-17 | End: 2024-06-17 | Stop reason: HOSPADM

## 2024-06-17 RX ORDER — FENTANYL CITRATE 50 UG/ML
50 INJECTION, SOLUTION INTRAMUSCULAR; INTRAVENOUS EVERY 5 MIN PRN
Status: DISCONTINUED | OUTPATIENT
Start: 2024-06-17 | End: 2024-06-17 | Stop reason: HOSPADM

## 2024-06-17 RX ORDER — FENTANYL CITRATE 50 UG/ML
INJECTION, SOLUTION INTRAMUSCULAR; INTRAVENOUS PRN
Status: DISCONTINUED | OUTPATIENT
Start: 2024-06-17 | End: 2024-06-17 | Stop reason: SDUPTHER

## 2024-06-17 RX ORDER — ONDANSETRON 2 MG/ML
INJECTION INTRAMUSCULAR; INTRAVENOUS PRN
Status: DISCONTINUED | OUTPATIENT
Start: 2024-06-17 | End: 2024-06-17 | Stop reason: SDUPTHER

## 2024-06-17 RX ORDER — DEXAMETHASONE SODIUM PHOSPHATE 4 MG/ML
INJECTION, SOLUTION INTRA-ARTICULAR; INTRALESIONAL; INTRAMUSCULAR; INTRAVENOUS; SOFT TISSUE PRN
Status: DISCONTINUED | OUTPATIENT
Start: 2024-06-17 | End: 2024-06-17 | Stop reason: SDUPTHER

## 2024-06-17 RX ORDER — OXYCODONE HYDROCHLORIDE AND ACETAMINOPHEN 5; 325 MG/1; MG/1
1 TABLET ORAL ONCE
Status: COMPLETED | OUTPATIENT
Start: 2024-06-17 | End: 2024-06-17

## 2024-06-17 RX ORDER — OXYCODONE HYDROCHLORIDE AND ACETAMINOPHEN 5; 325 MG/1; MG/1
1 TABLET ORAL EVERY 6 HOURS PRN
Qty: 16 TABLET | Refills: 0 | Status: SHIPPED | OUTPATIENT
Start: 2024-06-17 | End: 2024-06-21

## 2024-06-17 RX ORDER — SODIUM CHLORIDE 0.9 % (FLUSH) 0.9 %
5-40 SYRINGE (ML) INJECTION PRN
Status: DISCONTINUED | OUTPATIENT
Start: 2024-06-17 | End: 2024-06-17 | Stop reason: HOSPADM

## 2024-06-17 RX ORDER — MIDAZOLAM HYDROCHLORIDE 1 MG/ML
INJECTION INTRAMUSCULAR; INTRAVENOUS PRN
Status: DISCONTINUED | OUTPATIENT
Start: 2024-06-17 | End: 2024-06-17 | Stop reason: SDUPTHER

## 2024-06-17 RX ORDER — NALOXONE HYDROCHLORIDE 0.4 MG/ML
INJECTION, SOLUTION INTRAMUSCULAR; INTRAVENOUS; SUBCUTANEOUS PRN
Status: DISCONTINUED | OUTPATIENT
Start: 2024-06-17 | End: 2024-06-17 | Stop reason: HOSPADM

## 2024-06-17 RX ORDER — ROCURONIUM BROMIDE 10 MG/ML
INJECTION, SOLUTION INTRAVENOUS PRN
Status: DISCONTINUED | OUTPATIENT
Start: 2024-06-17 | End: 2024-06-17 | Stop reason: SDUPTHER

## 2024-06-17 RX ADMIN — PROPOFOL 50 MG: 10 INJECTION, EMULSION INTRAVENOUS at 08:11

## 2024-06-17 RX ADMIN — FENTANYL CITRATE 50 MCG: 50 INJECTION, SOLUTION INTRAMUSCULAR; INTRAVENOUS at 08:24

## 2024-06-17 RX ADMIN — MIDAZOLAM 2 MG: 1 INJECTION INTRAMUSCULAR; INTRAVENOUS at 07:31

## 2024-06-17 RX ADMIN — FENTANYL CITRATE 50 MCG: 50 INJECTION, SOLUTION INTRAMUSCULAR; INTRAVENOUS at 08:02

## 2024-06-17 RX ADMIN — SUGAMMADEX 200 MG: 100 INJECTION, SOLUTION INTRAVENOUS at 08:20

## 2024-06-17 RX ADMIN — LIDOCAINE HYDROCHLORIDE 100 MG: 20 INJECTION, SOLUTION INFILTRATION; PERINEURAL at 07:33

## 2024-06-17 RX ADMIN — DEXAMETHASONE SODIUM PHOSPHATE 10 MG: 4 INJECTION, SOLUTION INTRAMUSCULAR; INTRAVENOUS at 07:49

## 2024-06-17 RX ADMIN — FENTANYL CITRATE 100 MCG: 50 INJECTION, SOLUTION INTRAMUSCULAR; INTRAVENOUS at 07:33

## 2024-06-17 RX ADMIN — ROCURONIUM BROMIDE 50 MG: 10 INJECTION INTRAVENOUS at 07:35

## 2024-06-17 RX ADMIN — PROPOFOL 150 MG: 10 INJECTION, EMULSION INTRAVENOUS at 07:35

## 2024-06-17 RX ADMIN — SODIUM CHLORIDE: 9 INJECTION, SOLUTION INTRAVENOUS at 06:33

## 2024-06-17 RX ADMIN — ONDANSETRON 4 MG: 2 INJECTION INTRAMUSCULAR; INTRAVENOUS at 08:17

## 2024-06-17 RX ADMIN — OXYCODONE HYDROCHLORIDE AND ACETAMINOPHEN 1 TABLET: 5; 325 TABLET ORAL at 09:37

## 2024-06-17 RX ADMIN — ONDANSETRON 4 MG: 2 INJECTION INTRAMUSCULAR; INTRAVENOUS at 07:49

## 2024-06-17 ASSESSMENT — PAIN DESCRIPTION - FREQUENCY
FREQUENCY: INTERMITTENT
FREQUENCY: INTERMITTENT
FREQUENCY: CONTINUOUS
FREQUENCY: CONTINUOUS

## 2024-06-17 ASSESSMENT — PAIN - FUNCTIONAL ASSESSMENT
PAIN_FUNCTIONAL_ASSESSMENT: ACTIVITIES ARE NOT PREVENTED
PAIN_FUNCTIONAL_ASSESSMENT: PREVENTS OR INTERFERES WITH MANY ACTIVE NOT PASSIVE ACTIVITIES

## 2024-06-17 ASSESSMENT — PAIN DESCRIPTION - PAIN TYPE
TYPE: SURGICAL PAIN

## 2024-06-17 ASSESSMENT — PAIN DESCRIPTION - DESCRIPTORS
DESCRIPTORS: ACHING;SORE
DESCRIPTORS: ACHING;SORE
DESCRIPTORS: SORE
DESCRIPTORS: SORE

## 2024-06-17 ASSESSMENT — PAIN SCALES - GENERAL
PAINLEVEL_OUTOF10: 8
PAINLEVEL_OUTOF10: 6
PAINLEVEL_OUTOF10: 0
PAINLEVEL_OUTOF10: 6
PAINLEVEL_OUTOF10: 10
PAINLEVEL_OUTOF10: 5
PAINLEVEL_OUTOF10: 10

## 2024-06-17 ASSESSMENT — PAIN DESCRIPTION - ONSET
ONSET: ON-GOING

## 2024-06-17 ASSESSMENT — PAIN DESCRIPTION - ORIENTATION
ORIENTATION: LOWER;MID;RIGHT;LEFT
ORIENTATION: LOWER

## 2024-06-17 ASSESSMENT — PAIN DESCRIPTION - LOCATION
LOCATION: ABDOMEN

## 2024-06-17 NOTE — ANESTHESIA PRE PROCEDURE
Department of Anesthesiology  Preprocedure Note       Name:  Divine Skinner   Age:  53 y.o.  :  1970                                          MRN:  [id number]         Date:  2024      Surgeon: Surgeon(s):  Herman Garcia MD    Procedure: Procedure(s):  Laparoscopic Cholecystectomy possible Open    Medications prior to admission:   Prior to Admission medications    Medication Sig Start Date End Date Taking? Authorizing Provider   OMEPRAZOLE PO Take 20 mg by mouth daily as needed    Provider, MD Jono   ondansetron (ZOFRAN-ODT) 4 MG disintegrating tablet Take 1 tablet by mouth 3 times daily as needed for Nausea or Vomiting 24   Jethro Pyle PA   lisinopril (PRINIVIL;ZESTRIL) 10 MG tablet Take 1 tablet by mouth daily  Patient taking differently: Take 1 tablet by mouth daily Takes afternoon 24   Manpreet Ellington MD       Current medications:    Current Facility-Administered Medications   Medication Dose Route Frequency Provider Last Rate Last Admin   • 0.9 % sodium chloride infusion   IntraVENous Continuous Herman Garcia  mL/hr at 24 0633 New Bag at 24 0633       Allergies:    Allergies   Allergen Reactions   • Sertraline Other (See Comments)     EXCESSIVE BLEEDING.        Problem List:    Patient Active Problem List   Diagnosis Code   • Viral upper respiratory tract infection J06.9   • Meningioma (HCC) D32.9       Past Medical History:        Diagnosis Date   • Gall stones    • Hypertension    • PONV (postoperative nausea and vomiting)     after nasal surgery       Past Surgical History:        Procedure Laterality Date   • NASAL FRACTURE SURGERY      age 20   • TUBAL LIGATION      age 26       Social History:    Social History     Tobacco Use   • Smoking status: Never   • Smokeless tobacco: Never   Substance Use Topics   • Alcohol use: Never     Comment: never                                Counseling given: Not Answered      Vital Signs (Current):

## 2024-06-17 NOTE — ANESTHESIA POSTPROCEDURE EVALUATION
Department of Anesthesiology  Postprocedure Note    Patient: Divine Skinner  MRN: [id number]  YOB: 1970  Date of evaluation: 6/17/2024    Procedure Summary       Date: 06/17/24 [facility]    Anesthesia Start: 0730 Anesthesia Stop: 0841    Procedure: Laparoscopic Cholecystectomy (Abdomen) Diagnosis:       Gallstones      (Gallstones [K80.20])    Surgeons: Herman Garcia MD Responsible Provider: King Abdi DO    Anesthesia Type: general ASA Status: 3            Anesthesia Type: No value filed.    Car Phase I: Car Score: 8    Car Phase II:      Anesthesia Post Evaluation    Comments: [facility]  POST-ANESTHESIA NOTE       Name:  Divine Skinner                                         Age:  53 y.o.  MRN:  [id number]      Last Vitals:  /64   Pulse 64   Temp 96.8 °F (36 °C) (Temporal)   Resp 16   Ht 1.638 m (5' 4.5\")   Wt 125.2 kg (276 lb)   SpO2 95%   BMI 46.64 kg/m²   Patient Vitals in the past 4 hrs:  06/17/24 0940, BP:132/64, Pulse:64, Resp:16, SpO2:95 %  06/17/24 0915, BP:131/63, Temp:96.8 °F (36 °C), Temp src:Temporal, Pulse:64, Resp:16, SpO2:93 %  06/17/24 0905, BP:(!) 151/68, Pulse:64, Resp:16, SpO2:96 %  06/17/24 0900, BP:(!) 146/64, Pulse:65, Resp:16, SpO2:95 %  06/17/24 0855, BP:(!) 166/76, Pulse:66, Resp:17, SpO2:97 %  06/17/24 0850, BP:(!) 160/73, Pulse:65, Resp:20, SpO2:97 %  06/17/24 0845, BP:(!) 166/74, Pulse:67, Resp:18, SpO2:95 %  06/17/24 0840, BP:(!) 153/72, Pulse:67, Resp:17, SpO2:95 %  06/17/24 0836, BP:(!) 152/67, Temp:98 °F (36.7 °C), Temp src:Temporal, Pulse:72, Resp:16, SpO2:93 %    Level of Consciousness:  Awake    Respiratory:  Stable    Oxygen Saturation:  Stable    Cardiovascular:  Stable    Hydration:  Adequate    PONV:  Stable    Post-op Pain:  Adequate analgesia    Post-op Assessment:  No apparent anesthetic complications    Additional Follow-Up / Treatment / Comment:  None    King Abdi,   June 17,

## 2024-06-17 NOTE — DISCHARGE INSTRUCTIONS
Office 2 wks    GENERAL ANESTHESIA OR SEDATION  1. Do not drive or operate hazardous machinery for 24 hours.  2. Do not make important business or personal decisions for 24 hours.  3. Do not drink alcoholic beverages or use tobacco for 24 hours.    ACTIVITY INSTRUCTIONS:  [] Rest today. Resume light to normal activity tomorrow.   [] You may resume normal activity tomorrow. Do not engage in strenuous activity that may place stress on your incision.  [x] Do not drive for 3-5 days and avoid heavy lifting, tugging, pullings greater than 10-20 lbs until seen in the office.      DIET INSTRUCTIONS:  []Begin with clear liquids. If not nauseated, may increase to a low-fat diet when you desire. Greasy and spicy foods are not advised.  [x]Regular diet as tolerated.  []Other:     MEDICATIONS  [x]Prescription sent with you to be used as directed.   []Lortab   []Vicodin   [x]Percocet   []Tylenol #3   []Oxycontin   Do not drink alcohol or drive while taking these medications. You may experience dizziness or drowsiness with these medications. You may also experience constipation which can be relieved with stool softners or laxatives.  [x]You may resume your daily prescription medication schedule unless otherwise specified.  [x]Do not take 325mg Aspirin or other blood thinners such as Coumadin or Plavix for 5 days.     WOUND/DRESSING INSTRUCTIONS:  Always ensure you and your care giver clean hands before and after caring for the wound.  [x] Keep dressing clean and dry for 48 hours. Change when soiled or wet.      [x] Allow steri-strips to fall off on their own.   [x] Ice operative site for 20 minutes 4 times a day.     [x] May wash over incision in shower in 48 hours, but do not soak in a bath.  [] Take sitz bath for 20 minutes twice daily and after bowel movements.  [] Keep the abdominal binder in place during the day. May remove to shower and at night.  [] Remove packing from wound in 24 hours and replace with AMD dressings

## 2024-06-17 NOTE — H&P
[facility]  History and Physical Update      Pt Name: Divine Skinner  MRN: [id number]  YOB: 1970  Date of evaluation: 6/16/2024    [x] I have examined the patient and reviewed the H&P/Consult and there are no changes to the patient or plans.    [] I have examined the patient and reviewed the H&P/Consult and have noted the following changes:        Herman Garcia MD  Electronically signed 6/16/2024 at 9:19 PM

## 2024-06-17 NOTE — PROGRESS NOTES
0836  Pt arrives to PACU, responds to voice. Resp easy and unlabored on 4L NC. Pt denies pain at this time. VSS    0850 Patient resting in bed, states pain is a 6/10, but tolerable at this time. Denies need for pain medication at this time.     0905 Patient resting in bed, resp easy and unlabored on 2 L NC. Pt denies need for pain medication, VSS    0906 Patient meets criteria for discharge from PACU at this time.     0908 Transported to [facility] in stable condition     0910 Report given to Raymon CAMARENA  
PAT call attempted, patient unavailable, left message to please call us back at your earliest convenience; [phone]  
Patient oriented to Same Day department and admitted to Same Day Surgery room 9.   Patient verbalized approval for first name, last initial with physician name on unit whiteboard.     Plan of care reviewed with patient.   Patient room whiteboard filled out and discussed with patient and responsible adult.       Call light in reach.   Bed in lowest position, locked, with one bed rail up.   SCDs and warming blanket in place.  Appropriate arm bands on patient.   Bathroom offered.   All questions and concerns of patient addressed.        Meds to Beds:   Patient informed of [facility] Meds to Beds program during admission. Patient has declined use of program.            
Pt has met discharge criteria and states she is ready for discharge to home. IV removed, gauze and tape applied. Dressed in own clothes and personal belongings gathered. Discharge instructions (with opioid medication education information) given to pt and family; pt and family verbalized understanding of discharge instructions, prescriptions and follow up appointments. Pt transported to discharge lobby by \[facility]\"" staff.   
Pt returned to [facility] room 9. Vitals and assessment as charted. 0.9 infusing, @700 ml to count from PACU. Pt has crackers and water. Family at the bedside. Pt and family verbalized understanding of discharge criteria and call light use. Call light in reach.   
normal (ped)...

## 2024-06-17 NOTE — BRIEF OP NOTE
Brief Postoperative Note      Patient: Divine Skinner  YOB: 1970  MRN: [id number]    Date of Procedure: 6/17/2024    Pre-Op Diagnosis Codes:     * Gallstones [K80.20]    Post-Op Diagnosis: same       Procedure(s):  Laparoscopic Cholecystectomy    Surgeon(s):  Herman Garcia MD    Assistant:  First Assistant: Jolie Jeffers    Anesthesia: General    Estimated Blood Loss (mL): 10 ml    Complications: none    Specimens:   ID Type Source Tests Collected by Time Destination   A : Gallbladder Tissue Gallbladder SURGICAL PATHOLOGY Herman Garcia MD 6/17/2024 0759        Implants:        Drains: none    Findings:  Infection Present At Time Of Surgery (PATOS) (choose all levels that have infection present):  No infection present  Other Findings: see op note    Electronically signed by Herman Garcia MD on 6/17/2024 at 8:49 AM

## 2024-06-17 NOTE — H&P
[address] [address]                            PREOPERATIVE H&P      PATIENT NAME: JEFFERSON HOANG               : 1970  MED REC NO: [id number]                       ROOM: [facility]                                               (General) POOL                                               RM    ACCOUNT NO: [id number]                       ADMIT DATE: 2024  PROVIDER: Herman Garcia MD      DATE OF ADMISSION:  2024.    CHIEF COMPLAINT:  Symptomatic cholelithiasis.    HISTORY OF PRESENT ILLNESS:  The patient is a 53-year-old female who just recently been having symptoms of biliary colic with right upper quadrant pain going around side, to back.  She presented to the emergency room about 3 weeks ago and was found to have cholelithiasis.  She has a family history of gallbladder disease.  She was seen in the office.  She does have a history of fried fatty food intolerance and nausea, which she is being admitted at this time for laparoscopic cholecystectomy.    PAST MEDICAL HISTORY:  Positive for hypertension and reflux disease.  She also has a history of depression and obesity.    PAST SURGICAL HISTORY:  Includes tubal ligation.  She has had nasal surgery, and she has had a stress test in May of 2024.    MEDICATIONS:  Lisinopril and omeprazole.    ALLERGIES:  SERTRALINE.      REVIEW OF SYSTEMS:  Ten-point review of systems is negative.    PHYSICAL EXAMINATION:  GENERAL:  The patient is a 53-year-old female, appears her age.  HEAD, EARS, EYES, NOSE, AND THROAT: Show no scleral icterus.  CARDIOVASCULAR: S1-S2.  RESPIRATIONS:  Clear.    ABDOMEN:  Soft.  She does have a positive Candelaria sign.  Otherwise, the abdomen is normal.    ASSESSMENT AND PLAN:  Symptomatic cholelithiasis.  The patient is being admitted at this time for laparoscopic cholecystectomy.          HERMAN GARCIA MD      D:  2024 21:23:25     T:   [place] Counseling: I discussed with the patient the risks of tildrakizumab including but not limited to immunosuppression, malignancy, posterior leukoencephalopathy syndrome, and serious infections.  The patient understands that monitoring is required including a PPD at baseline and must alert us or the primary physician if symptoms of infection or other concerning signs are noted.

## 2024-06-17 NOTE — OP NOTE
[address] [address]                            OPERATIVE REPORT      PATIENT NAME: JEFFERSON HOANG               : 1970  MED REC NO: [id number]                       ROOM: [facility]                                               (General) POOL                                               RM    ACCOUNT NO: [id number]                       ADMIT DATE: 2024  PROVIDER: Herman Garcia MD      DATE OF PROCEDURE:  2024    SURGEON:  Herman Garcia MD    PREOPERATIVE DIAGNOSIS:  Cholelithiasis, chronic cholecystitis.    POSTOPERATIVE DIAGNOSIS:  Cholelithiasis, chronic cholecystitis.    OPERATION:  Laparoscopic cholecystectomy.    ANESTHESIA:  General.    COMPLICATIONS:  None.    BLOOD LOSS:  10 mL.    INDICATION FOR PROCEDURE:  Patient is a 53-year-old female with symptomatic cholelithiasis.    FINDINGS:  Patient had a gallbladder full of gallstones.  Gallbladder was clearly chronically diseased.  Liver was fatty infiltrated.  Otherwise, no other abnormalities seen.    PROCEDURE DESCRIPTION:  Patient was brought into the operating suite, placed supine on the operating room table.  After adequate inhalational anesthesia was administered, the patient's abdomen was prepped and draped in the usual sterile fashion.  I used an Optiview trocar to gain access into the abdominal cavity after we made an incision below the umbilicus and the Optiview was placed.  The other 3 trocars, 1 in the epigastrium and 2 laterally were placed under direct visualization.  The gallbladder was noted to be fatty infiltrated.  The dome of the gallbladder was grasped.  The gallbladder was somewhat contracted, but we elevated the dome of the gallbladder.  There was a lot of fatty tissue and adhesions up to the neck of the gallbladder and there was hard firmness that likely represented stones in the neck that made it difficult to grab the gallbladder and

## 2024-06-24 ENCOUNTER — TELEPHONE (OUTPATIENT)
Dept: SURGERY | Age: 54
End: 2024-06-24

## 2024-06-24 ENCOUNTER — OFFICE VISIT (OUTPATIENT)
Dept: FAMILY MEDICINE CLINIC | Age: 54
End: 2024-06-24
Payer: COMMERCIAL

## 2024-06-24 VITALS
HEART RATE: 53 BPM | TEMPERATURE: 97.8 F | OXYGEN SATURATION: 97 % | WEIGHT: 267 LBS | DIASTOLIC BLOOD PRESSURE: 82 MMHG | HEIGHT: 64 IN | RESPIRATION RATE: 12 BRPM | BODY MASS INDEX: 45.58 KG/M2 | SYSTOLIC BLOOD PRESSURE: 136 MMHG

## 2024-06-24 DIAGNOSIS — Z12.39 ENCOUNTER FOR SCREENING FOR MALIGNANT NEOPLASM OF BREAST, UNSPECIFIED SCREENING MODALITY: ICD-10-CM

## 2024-06-24 DIAGNOSIS — Z09 POSTOPERATIVE FOLLOW-UP: ICD-10-CM

## 2024-06-24 DIAGNOSIS — I10 PRIMARY HYPERTENSION: ICD-10-CM

## 2024-06-24 DIAGNOSIS — Z12.11 ENCOUNTER FOR SCREENING COLONOSCOPY: ICD-10-CM

## 2024-06-24 DIAGNOSIS — Z11.59 NEED FOR HEPATITIS C SCREENING TEST: ICD-10-CM

## 2024-06-24 DIAGNOSIS — Z13.1 SCREENING FOR DIABETES MELLITUS: ICD-10-CM

## 2024-06-24 DIAGNOSIS — N83.202 CYSTS OF BOTH OVARIES: ICD-10-CM

## 2024-06-24 DIAGNOSIS — Z13.220 SCREENING, LIPID: ICD-10-CM

## 2024-06-24 DIAGNOSIS — Z11.4 ENCOUNTER FOR SCREENING FOR HIV: ICD-10-CM

## 2024-06-24 DIAGNOSIS — N83.201 CYSTS OF BOTH OVARIES: ICD-10-CM

## 2024-06-24 PROCEDURE — 99214 OFFICE O/P EST MOD 30 MIN: CPT

## 2024-06-24 PROCEDURE — 3075F SYST BP GE 130 - 139MM HG: CPT | Performed by: FAMILY MEDICINE

## 2024-06-24 PROCEDURE — 3079F DIAST BP 80-89 MM HG: CPT | Performed by: FAMILY MEDICINE

## 2024-06-24 RX ORDER — ACETAMINOPHEN 500 MG
500 TABLET ORAL EVERY 6 HOURS PRN
COMMUNITY

## 2024-06-24 NOTE — PROGRESS NOTES
Health Maintenance Due   Topic Date Due    Hepatitis B vaccine (1 of 3 - 3-dose series) Never done    COVID-19 Vaccine (1) Never done    HIV screen  Never done    Hepatitis C screen  Never done    Cervical cancer screen  Never done    Breast cancer screen  08/03/2014    Colorectal Cancer Screen  Never done    Shingles vaccine (1 of 2) Never done    Diabetes screen  02/18/2024       
Herman Garcia MD at [facility] OR    NASAL FRACTURE SURGERY      age 20    TUBAL LIGATION      age 26       Medications:      has a current medication list which includes the following prescription(s): acetaminophen, omeprazole, ondansetron, and lisinopril.     Allergies:      Sertraline    Social History     Socioeconomic History    Marital status:      Spouse name: None    Number of children: None    Years of education: None    Highest education level: None   Tobacco Use    Smoking status: Never    Smokeless tobacco: Never   Vaping Use    Vaping Use: Never used   Substance and Sexual Activity    Alcohol use: Never     Comment: never    Drug use: No    Sexual activity: Yes     Partners: Male     Social Determinants of Health     Financial Resource Strain: Low Risk  (4/16/2024)    Overall Financial Resource Strain (CARDIA)     Difficulty of Paying Living Expenses: Not very hard   Food Insecurity: No Food Insecurity (4/16/2024)    Hunger Vital Sign     Worried About Running Out of Food in the Last Year: Never true     Ran Out of Food in the Last Year: Never true   Transportation Needs: Unknown (4/16/2024)    PRAPARE - Transportation     Lack of Transportation (Non-Medical): No   Housing Stability: Unknown (4/16/2024)    Housing Stability Vital Sign     Unstable Housing in the Last Year: No        Family History:          Problem Relation Age of Onset    Heart Disease Mother     High Blood Pressure Father     Diabetes Maternal Grandmother     Diabetes Maternal Grandfather     Heart Disease Paternal Grandfather     Dementia Paternal Grandfather     Diabetes Paternal Grandfather        Objective:     Vitals:    06/24/24 0840   BP: 136/82   Site: Right Upper Arm   Position: Sitting   Cuff Size: Large Adult   Pulse: 53   Resp: 12   Temp: 97.8 °F (36.6 °C)   TempSrc: Oral   SpO2: 97%   Weight: 121.1 kg (267 lb)   Height: 1.638 m (5' 4.49\")       Physical Exam  Constitutional:       Appearance: Normal appearance. 
      Plan    Continue lisinopril.  Blood work update   Referred to colonoscopy screening clinic  Pap to be done here  Needs follow up on ovarian cyst due to 6 cm -- US with us or gyn referral.     Return in about 3 months (around 9/24/2024).    Orders Placed:  No orders of the defined types were placed in this encounter.    Medications Prescribed:  No orders of the defined types were placed in this encounter.      Future Appointments   Date Time Provider Department Center   6/16/2025  9:00 AM Cooper Jarrell MD [facility]       [facility] Due   Topic Date Due    Hepatitis B vaccine (1 of 3 - 3-dose series) Never done    COVID-19 Vaccine (1) Never done    HIV screen  Never done    Hepatitis C screen  Never done    Cervical cancer screen  Never done    Breast cancer screen  08/03/2014    Colorectal Cancer Screen  Never done    Shingles vaccine (1 of 2) Never done    Diabetes screen  02/18/2024         Attending Physician Statement  I have discussed the case, including pertinent history and exam findings with the resident. I also have seen the patient and performed key portions of the examination.  I agree with the documented assessment and plan as documented by the resident.  GE modifier added to this encounter      Sabina Barr MD 6/24/2024 9:44 AM

## 2024-06-24 NOTE — PATIENT INSTRUCTIONS
Thank you   Thank you for trusting us with your healthcare needs. You may receive a survey regarding today's visit. It would help us out if you would take a few moments to provide your feedback. We value your input.  Please bring in ALL medications BOTTLES, including inhalers, herbal supplements, over the counter, prescribed & non-prescribed medicine. The office would like actual medication bottles and a list.         4.  Prior to getting your labs drawn, check with your insurance company for benefits and eligibility of lab services.  Often, insurance companies cover certain tests for preventative visits only.  It is patient's responsibility to    see what is covered.    5.  If the list below has been completed, PLEASE FAX RECORDS TO OUR OFFICE @ [phone]. Once the records have been received we will update your records at our office:  Health Maintenance Due   Topic Date Due    Hepatitis B vaccine (1 of 3 - 3-dose series) Never done    COVID-19 Vaccine (1) Never done    HIV screen  Never done    Hepatitis C screen  Never done    Cervical cancer screen  Never done    Breast cancer screen  08/03/2014    Colorectal Cancer Screen  Never done    Shingles vaccine (1 of 2) Never done    Diabetes screen  02/18/2024

## 2024-06-25 ENCOUNTER — HOSPITAL ENCOUNTER (OUTPATIENT)
Age: 54
Discharge: HOME | End: 2024-06-25
Payer: COMMERCIAL

## 2024-06-25 DIAGNOSIS — Z13.220 SCREENING, LIPID: ICD-10-CM

## 2024-06-25 DIAGNOSIS — Z13.1 SCREENING FOR DIABETES MELLITUS: ICD-10-CM

## 2024-06-25 DIAGNOSIS — I10 PRIMARY HYPERTENSION: ICD-10-CM

## 2024-06-25 LAB
ALBUMIN SERPL BCG-MCNC: 3.8 G/DL (ref 3.5–5.1)
ALP SERPL-CCNC: 90 U/L (ref 38–126)
ALT SERPL W/O P-5'-P-CCNC: 37 U/L (ref 11–66)
ANION GAP SERPL CALC-SCNC: 6 MEQ/L (ref 8–16)
AST SERPL-CCNC: 17 U/L (ref 5–40)
BILIRUB SERPL-MCNC: 0.3 MG/DL (ref 0.3–1.2)
BUN SERPL-MCNC: 24 MG/DL (ref 7–22)
CALCIUM SERPL-MCNC: 9 MG/DL (ref 8.5–10.5)
CHLORIDE SERPL-SCNC: 108 MEQ/L (ref 98–111)
CHOLEST SERPL-MCNC: 98 MG/DL (ref 100–199)
CO2 SERPL-SCNC: 27 MEQ/L (ref 23–33)
CREAT SERPL-MCNC: 0.7 MG/DL (ref 0.4–1.2)
DEPRECATED MEAN GLUCOSE BLD GHB EST-ACNC: 123 MG/DL (ref 70–126)
GFR SERPL CREATININE-BSD FRML MDRD: > 90 ML/MIN/1.73M2
GLUCOSE SERPL-MCNC: 114 MG/DL (ref 70–108)
HBA1C MFR BLD HPLC: 6.1 % (ref 4.4–6.4)
HDLC SERPL-MCNC: 48 MG/DL
LDLC SERPL CALC-MCNC: 42 MG/DL
POTASSIUM SERPL-SCNC: 4.1 MEQ/L (ref 3.5–5.2)
PROT SERPL-MCNC: 6.9 G/DL (ref 6.1–8)
SODIUM SERPL-SCNC: 141 MEQ/L (ref 135–145)
TRIGL SERPL-MCNC: 40 MG/DL (ref 0–199)

## 2024-06-25 PROCEDURE — 80061 LIPID PANEL: CPT

## 2024-06-25 PROCEDURE — 36415 COLL VENOUS BLD VENIPUNCTURE: CPT

## 2024-06-25 PROCEDURE — 80053 COMPREHEN METABOLIC PANEL: CPT

## 2024-06-25 PROCEDURE — 83036 HEMOGLOBIN GLYCOSYLATED A1C: CPT

## 2024-06-25 ASSESSMENT — ENCOUNTER SYMPTOMS
RHINORRHEA: 0
COUGH: 0
SHORTNESS OF BREATH: 0
VOMITING: 0
ABDOMINAL PAIN: 0
NAUSEA: 0
BLOOD IN STOOL: 0
DIARRHEA: 0

## 2024-07-08 ENCOUNTER — TELEPHONE (OUTPATIENT)
Dept: FAMILY MEDICINE CLINIC | Age: 54
End: 2024-07-08

## 2024-07-08 NOTE — TELEPHONE ENCOUNTER
Left detailed message per HIPAA also informed patient to call office with any questions or concerns.

## 2024-07-09 ENCOUNTER — TELEPHONE (OUTPATIENT)
Dept: SURGERY | Age: 54
End: 2024-07-09

## 2024-07-09 NOTE — TELEPHONE ENCOUNTER
Left voicemail to call us back to schedule a consult with Dr. Evans.   NP; Dr. Evans Colonoscopy Fredi Negrete, DO referal.

## 2024-10-21 DIAGNOSIS — I10 PRIMARY HYPERTENSION: ICD-10-CM

## 2024-10-21 NOTE — TELEPHONE ENCOUNTER
Patient's last appointment was: 6/24/2024  with our   Patient's next appointment is: Visit date not found    Last refilled on: 8/8/24  Which pharmacy does the script need sent to: [facility]      Lab Results   Component Value Date    LABA1C 6.1 06/25/2024     Lab Results   Component Value Date    CHOL 98 (L) 06/25/2024    TRIG 40 06/25/2024    HDL 48 06/25/2024     Lab Results   Component Value Date     06/25/2024    K 4.1 06/25/2024     06/25/2024    CO2 27 06/25/2024    BUN 24 (H) 06/25/2024    CREATININE 0.7 06/25/2024    GLUCOSE 114 (H) 06/25/2024    CALCIUM 9.0 06/25/2024    BILITOT 0.3 06/25/2024    ALKPHOS 90 06/25/2024    AST 17 06/25/2024    ALT 37 06/25/2024    LABGLOM > 90 06/25/2024     Lab Results   Component Value Date    TSH 3.410 04/11/2024     Lab Results   Component Value Date    WBC 5.2 05/18/2024    HGB 13.7 05/18/2024    HCT 42.2 05/18/2024    MCV 86.3 05/18/2024     05/18/2024

## 2024-10-27 RX ORDER — LISINOPRIL 10 MG/1
10 TABLET ORAL DAILY
Qty: 30 TABLET | Refills: 2 | Status: SHIPPED | OUTPATIENT
Start: 2024-10-27

## 2024-11-22 ENCOUNTER — HOSPITAL ENCOUNTER (EMERGENCY)
Age: 54
Discharge: HOME | End: 2024-11-22
Payer: COMMERCIAL

## 2024-11-22 VITALS
TEMPERATURE: 98.5 F | BODY MASS INDEX: 47.91 KG/M2 | OXYGEN SATURATION: 97 % | RESPIRATION RATE: 16 BRPM | WEIGHT: 283.4 LBS | DIASTOLIC BLOOD PRESSURE: 85 MMHG | SYSTOLIC BLOOD PRESSURE: 150 MMHG | HEART RATE: 63 BPM

## 2024-11-22 DIAGNOSIS — H66.92 LEFT OTITIS MEDIA, UNSPECIFIED OTITIS MEDIA TYPE: Primary | ICD-10-CM

## 2024-11-22 PROCEDURE — 99213 OFFICE O/P EST LOW 20 MIN: CPT

## 2024-11-22 PROCEDURE — 99213 OFFICE O/P EST LOW 20 MIN: CPT | Performed by: NURSE PRACTITIONER

## 2024-11-22 RX ORDER — BENZONATATE 200 MG/1
200 CAPSULE ORAL 3 TIMES DAILY PRN
Qty: 21 CAPSULE | Refills: 0 | Status: SHIPPED | OUTPATIENT
Start: 2024-11-22 | End: 2024-11-29

## 2024-11-22 RX ORDER — DESLORATADINE 5 MG/1
5 TABLET ORAL DAILY
Qty: 14 TABLET | Refills: 0 | Status: SHIPPED | OUTPATIENT
Start: 2024-11-22 | End: 2024-12-06

## 2024-11-22 RX ORDER — PREDNISONE 20 MG/1
20 TABLET ORAL 2 TIMES DAILY
Qty: 10 TABLET | Refills: 0 | Status: SHIPPED | OUTPATIENT
Start: 2024-11-22 | End: 2024-11-27

## 2024-11-22 ASSESSMENT — ENCOUNTER SYMPTOMS
SHORTNESS OF BREATH: 0
CHEST TIGHTNESS: 0
SORE THROAT: 1
SINUS PRESSURE: 1
RHINORRHEA: 1
CHOKING: 0
COUGH: 1
EYE DISCHARGE: 0
EYE PAIN: 1
EYE REDNESS: 0
SINUS CONGESTION: 1
EYE ITCHING: 0
PHOTOPHOBIA: 0
WHEEZING: 0
APNEA: 0

## 2024-11-22 ASSESSMENT — PAIN - FUNCTIONAL ASSESSMENT
PAIN_FUNCTIONAL_ASSESSMENT: ACTIVITIES ARE NOT PREVENTED
PAIN_FUNCTIONAL_ASSESSMENT: 0-10

## 2024-11-22 ASSESSMENT — PAIN DESCRIPTION - FREQUENCY: FREQUENCY: INTERMITTENT

## 2024-11-22 ASSESSMENT — PAIN DESCRIPTION - LOCATION: LOCATION: GENERALIZED

## 2024-11-22 ASSESSMENT — PAIN SCALES - GENERAL: PAINLEVEL_OUTOF10: 3

## 2024-11-22 NOTE — ED PROVIDER NOTES
[facility] URGENT CARE  Urgent Care Encounter      CHIEF COMPLAINT       Chief Complaint   Patient presents with    Cough       Nurses Notes reviewed and I agree except as noted in the HPI.  HISTORY OFPRESENT ILLNESS   Divine Skinner is a 54 y.o.  The history is provided by the patient and the spouse. No  was used.   Cough  Cough characteristics:  Vomit-inducing  Sputum characteristics:  Yellow  Severity:  Severe  Onset quality:  Unable to specify  Duration:  20 weeks  Timing:  Constant  Progression:  Worsening  Chronicity:  New  Smoker: no    Context: upper respiratory infection and weather changes    Context: not animal exposure, not exposure to allergens, not fumes, not occupational exposure, not sick contacts, not smoke exposure and not with activity    Relieved by:  Nothing  Worsened by:  Environmental changes, lying down and deep breathing  Ineffective treatments:  Fluids, steam, rest and cough suppressants (antihistamines)  Associated symptoms: ear pain, headaches, rhinorrhea, sinus congestion and sore throat    Associated symptoms: no chest pain, no chills, no diaphoresis, no ear fullness, no eye discharge, no fever, no myalgias, no rash, no shortness of breath, no weight loss and no wheezing    Risk factors: no chemical exposure, no recent infection and no recent travel        REVIEW OF SYSTEMS     Review of Systems   Constitutional:  Positive for fatigue. Negative for activity change, appetite change, chills, diaphoresis, fever and weight loss.   HENT:  Positive for congestion, ear pain, postnasal drip, rhinorrhea, sinus pressure and sore throat.    Eyes:  Positive for pain and visual disturbance. Negative for photophobia, discharge, redness and itching.   Respiratory:  Positive for cough. Negative for apnea, choking, chest tightness, shortness of breath and wheezing.    Cardiovascular:  Negative for chest pain, palpitations and leg swelling.   Musculoskeletal:  Negative  this point the patient can be treated safely at home, the parent or Patient representative should be aware of following interventions and  advised to the watch for the following:  #1.  Any increasing pain not controlled with Motrin or Tylenol.    #2.  Any development of drainage from the ears redness of the auricle or posterior ear.  #3.  Her development of the any fever chills, headache or stiffness of the neck the patient needs to be reevaluated by the primary care provider, return here or go to the emergency department for reevaluation.   I did discuss clinical findings with the patient as well as vital signs in assessment findings.  He was advised that the Patient has signs and symptoms of seasonal allergies. Patient is afebrile and stable. Patient can use Tylenol and/or OTC cough syrup. Avoid tobacco use/exposure,Take medication as directed,Drink Lots of fluids and Use Inhalers as directed if prescribed.   Advised to follow up with family doctor in the next 2-3 days for reevaluation.  The patient may return to urgent care if does not get better or symptoms worsen.  However the patient is advised to go to ER immediately if present symptoms worsen, high fever >102 , vomiting, breathing difficulty, chest pain, lethargy or new symptoms develop. Patient/ parents understands this approach of home management and agrees to the treatment plan.     REFERRED TO:  Fredi Negrete DO  [address] [address]  [phone]    Schedule an appointment as soon as possible for a visit       DISCHARGE MEDICATIONS:  New Prescriptions    AMOXICILLIN-CLAVULANATE (AUGMENTIN) 875-125 MG PER TABLET    Take 1 tablet by mouth 2 times daily for 7 days    BENZONATATE (TESSALON) 200 MG CAPSULE    Take 1 capsule by mouth 3 times daily as needed for Cough    DESLORATADINE (CLARINEX) 5 MG TABLET    Take 1 tablet by mouth daily for 14 days    PREDNISONE (DELTASONE) 20 MG TABLET    Take 1 tablet by mouth 2 times daily for 5 days

## 2024-11-22 NOTE — ED TRIAGE NOTES
Divine arrives to room with complaint of  cough, sinus congestion, headache for past 4 months, blood tinge sputum yesterday      Taking Nyquil, OTC cough, tylenol last dose yesterday 4:30 pm

## 2025-02-21 SDOH — HEALTH STABILITY: PHYSICAL HEALTH: ON AVERAGE, HOW MANY DAYS PER WEEK DO YOU ENGAGE IN MODERATE TO STRENUOUS EXERCISE (LIKE A BRISK WALK)?: 1 DAY

## 2025-02-21 SDOH — HEALTH STABILITY: PHYSICAL HEALTH: ON AVERAGE, HOW MANY MINUTES DO YOU ENGAGE IN EXERCISE AT THIS LEVEL?: 10 MIN

## 2025-02-24 ENCOUNTER — OFFICE VISIT (OUTPATIENT)
Dept: FAMILY MEDICINE CLINIC | Age: 55
End: 2025-02-24

## 2025-02-24 VITALS
TEMPERATURE: 97.8 F | SYSTOLIC BLOOD PRESSURE: 136 MMHG | HEIGHT: 64 IN | BODY MASS INDEX: 48.21 KG/M2 | DIASTOLIC BLOOD PRESSURE: 86 MMHG | HEART RATE: 70 BPM | OXYGEN SATURATION: 98 % | RESPIRATION RATE: 14 BRPM | WEIGHT: 282.4 LBS

## 2025-02-24 DIAGNOSIS — Z13.220 SCREENING, LIPID: ICD-10-CM

## 2025-02-24 DIAGNOSIS — D32.9 MENINGIOMA (HCC): ICD-10-CM

## 2025-02-24 DIAGNOSIS — Z12.11 ENCOUNTER FOR SCREENING COLONOSCOPY: ICD-10-CM

## 2025-02-24 DIAGNOSIS — E66.813 CLASS 3 SEVERE OBESITY WITHOUT SERIOUS COMORBIDITY WITH BODY MASS INDEX (BMI) OF 45.0 TO 49.9 IN ADULT, UNSPECIFIED OBESITY TYPE: ICD-10-CM

## 2025-02-24 DIAGNOSIS — Z12.31 ENCOUNTER FOR SCREENING MAMMOGRAM FOR BREAST CANCER: Primary | ICD-10-CM

## 2025-02-24 DIAGNOSIS — E66.01 CLASS 3 SEVERE OBESITY WITHOUT SERIOUS COMORBIDITY WITH BODY MASS INDEX (BMI) OF 45.0 TO 49.9 IN ADULT, UNSPECIFIED OBESITY TYPE: ICD-10-CM

## 2025-02-24 DIAGNOSIS — I10 PRIMARY HYPERTENSION: ICD-10-CM

## 2025-02-24 DIAGNOSIS — R73.03 PREDIABETES: ICD-10-CM

## 2025-02-24 RX ORDER — AMLODIPINE BESYLATE 5 MG/1
5 TABLET ORAL DAILY
Qty: 30 TABLET | Refills: 3 | Status: SHIPPED | OUTPATIENT
Start: 2025-02-24

## 2025-02-24 NOTE — PROGRESS NOTES
Divine Skinner is a 54 y.o. female thatpresents for New Patient, Cough (Pt has had a cough since July.), and Weight Management      History obtained today from Patient.    New pt to [facility] family practice  Was seeing Residency clinic      HPI    Diet: mostly at home, drinks soda, water, likes fruits and vegetables, forgets to eat   Exercise: likes to take walks  Sleep: broken up, snores, headaches, sometimes wakes up feeling rested  PMH: htn, allergies, anxiety, depression  Surgical hx:hx of cholecystectomy, tubal, fx nose  Social: non smoker, no alcohol use, no vaping  2nd hand smoke?  Concerns today:     Dry cough  Since end of July  If throat is dry  Lessened a little   Started taking Omeprazole recently  Has hx of allergies, taking benadryl as needed    HTN  On lisinopril  Started in April  Checks at home only if symptomatic    Snores  Thinks if she loses weight she will stop snoring  Has happened in the past   Declines sleep referral    Weight management  Has tried dirty keto and lost 30 lbs   Tried weight watchers 25 years ago  Lowest she has been in 240s   Has gained 10 lbs in last few months    Meningioma  Right Parietal  Last MRI 2021  Was following neurosurgery      Cervical cancer screening - used to see Dr Anderson. Not had period in over a year, DUE  Colon cancer screening - due  Breast cancer screening - due, last one 2012    I have reviewed the patient's past medical history, past surgical history, allergies,medications, social and family history and I have made updates where appropriate.    Past Medical History:   Diagnosis Date    Gall stones     Hypertension     PONV (postoperative nausea and vomiting)     after nasal surgery       Social History     Tobacco Use    Smoking status: Never    Smokeless tobacco: Never   Vaping Use    Vaping status: Never Used   Substance Use Topics    Alcohol use: Never     Comment: never    Drug use: No       Family History   Problem Relation Age of Onset    Heart

## 2025-02-24 NOTE — PATIENT INSTRUCTIONS
Address: [facility] [id number], [place] [address]  Hours: Open ? Closes 8?PM  Phone: [phone]

## 2025-02-25 ENCOUNTER — TELEPHONE (OUTPATIENT)
Dept: FAMILY MEDICINE CLINIC | Age: 55
End: 2025-02-25

## 2025-02-25 LAB
ALBUMIN: 4.2 G/DL (ref 3.5–5.2)
ALK PHOSPHATASE: 94 U/L (ref 30–111)
ALT SERPL-CCNC: 35 U/L (ref 5–33)
ANION GAP SERPL CALCULATED.3IONS-SCNC: 15 MMOL/L (ref 7–16)
AST SERPL-CCNC: 24 U/L (ref 9–40)
BASOPHILS ABSOLUTE: 0.06 K/UL (ref 0–0.2)
BASOPHILS RELATIVE PERCENT: 1 % (ref 0–2)
BILIRUB SERPL-MCNC: 0.4 MG/DL
BUN BLDV-MCNC: 15 MG/DL (ref 6–20)
CALCIUM SERPL-MCNC: 9.2 MG/DL (ref 8.6–10.5)
CHLORIDE BLD-SCNC: 104 MMOL/L (ref 96–107)
CHOLESTEROL, TOTAL: 108 MG/DL (ref 100–199)
CHOLESTEROL/HDL RATIO: 1.8 (ref 2–4.5)
CO2: 23 MMOL/L (ref 18–32)
CREAT SERPL-MCNC: 0.88 MG/DL (ref 0.51–1.15)
EGFR IF NONAFRICAN AMERICAN: 79 ML/MIN/1.73M2
EOSINOPHILS ABSOLUTE: 0.18 K/UL (ref 0–0.8)
EOSINOPHILS RELATIVE PERCENT: 3.1 % (ref 0–5)
ESTIMATED AVERAGE GLUCOSE: 123 MG/DL
GLUCOSE: 121 MG/DL (ref 70–100)
HBA1C MFR BLD: 5.9 %
HCT VFR BLD CALC: 40.2 % (ref 35–47)
HDLC SERPL-MCNC: 60 MG/DL
HEMOGLOBIN: 13.7 G/DL (ref 11.9–16)
IMMATURE GRANS (ABS): 0.02 K/UL (ref 0–0.06)
IMMATURE GRANULOCYTES %: 0.3 % (ref 0–2)
INSULIN: 40.3 UIU/ML (ref 2.6–24.9)
LDL CHOLESTEROL: 39 MG/DL
LDL/HDL RATIO: 0.7
LYMPHOCYTES ABSOLUTE: 2.53 K/UL (ref 0.9–5.2)
LYMPHOCYTES RELATIVE PERCENT: 43.3 % (ref 20–45)
MCH RBC QN AUTO: 29 PG (ref 26–33)
MCHC RBC AUTO-ENTMCNC: 34.1 G/DL (ref 32–35)
MCV RBC AUTO: 85 FL (ref 75–100)
MONOCYTES ABSOLUTE: 0.52 K/UL (ref 0.1–1)
MONOCYTES RELATIVE PERCENT: 8.9 % (ref 0–13)
NEUTROPHILS ABSOLUTE: 2.53 K/UL (ref 1.9–8)
NEUTROPHILS RELATIVE PERCENT: 43.4 % (ref 45–75)
PDW BLD-RTO: 13.3 % (ref 11.2–14.8)
PLATELET # BLD: 349 THOUS/CMM (ref 140–440)
POTASSIUM SERPL-SCNC: 4.5 MMOL/L (ref 3.5–5.4)
RBC # BLD: 4.73 MILL/CMM (ref 3.8–5.2)
SODIUM BLD-SCNC: 142 MMOL/L (ref 135–148)
TOTAL PROTEIN: 7.1 G/DL (ref 6–8.3)
TRIGL SERPL-MCNC: 44 MG/DL (ref 20–149)
TSH SERPL DL<=0.05 MIU/L-ACNC: 3.94 UIU/ML (ref 0.27–4.2)
VLDLC SERPL CALC-MCNC: 9 MG/DL
WBC # BLD: 5.8 THDS/CMM (ref 3.6–11)

## 2025-02-25 NOTE — TELEPHONE ENCOUNTER
----- Message from EMI Lee CNP sent at 2/25/2025 12:56 PM EST -----  Cholesterol looks good  Fasting blood sugar a little elevated but A1C stable from years past at 5.9%  Fasting insulin levels are elevated, recommend low carb diet, limiting sugars, breads, pastas, sweets, chips etc.  Can discuss at follow up appointment about treatment options  Thyroid labs normal

## 2025-02-25 NOTE — TELEPHONE ENCOUNTER
Left message on answering machine. Requested pt to call back at [phone], at their earliest convenience.

## 2025-03-17 SDOH — ECONOMIC STABILITY: INCOME INSECURITY: IN THE LAST 12 MONTHS, WAS THERE A TIME WHEN YOU WERE NOT ABLE TO PAY THE MORTGAGE OR RENT ON TIME?: YES

## 2025-03-17 SDOH — ECONOMIC STABILITY: FOOD INSECURITY: WITHIN THE PAST 12 MONTHS, YOU WORRIED THAT YOUR FOOD WOULD RUN OUT BEFORE YOU GOT MONEY TO BUY MORE.: NEVER TRUE

## 2025-03-17 SDOH — ECONOMIC STABILITY: FOOD INSECURITY: WITHIN THE PAST 12 MONTHS, THE FOOD YOU BOUGHT JUST DIDN'T LAST AND YOU DIDN'T HAVE MONEY TO GET MORE.: NEVER TRUE

## 2025-03-17 SDOH — ECONOMIC STABILITY: TRANSPORTATION INSECURITY
IN THE PAST 12 MONTHS, HAS LACK OF TRANSPORTATION KEPT YOU FROM MEETINGS, WORK, OR FROM GETTING THINGS NEEDED FOR DAILY LIVING?: NO

## 2025-03-17 ASSESSMENT — PATIENT HEALTH QUESTIONNAIRE - PHQ9
SUM OF ALL RESPONSES TO PHQ QUESTIONS 1-9: 0
1. LITTLE INTEREST OR PLEASURE IN DOING THINGS: NOT AT ALL
SUM OF ALL RESPONSES TO PHQ QUESTIONS 1-9: 0
SUM OF ALL RESPONSES TO PHQ9 QUESTIONS 1 & 2: 0
SUM OF ALL RESPONSES TO PHQ QUESTIONS 1-9: 0
SUM OF ALL RESPONSES TO PHQ QUESTIONS 1-9: 0
1. LITTLE INTEREST OR PLEASURE IN DOING THINGS: NOT AT ALL
2. FEELING DOWN, DEPRESSED OR HOPELESS: NOT AT ALL
2. FEELING DOWN, DEPRESSED OR HOPELESS: NOT AT ALL

## 2025-03-20 ENCOUNTER — OFFICE VISIT (OUTPATIENT)
Dept: FAMILY MEDICINE CLINIC | Age: 55
End: 2025-03-20

## 2025-03-20 VITALS
SYSTOLIC BLOOD PRESSURE: 130 MMHG | HEIGHT: 64 IN | TEMPERATURE: 97.7 F | WEIGHT: 270.2 LBS | RESPIRATION RATE: 14 BRPM | DIASTOLIC BLOOD PRESSURE: 84 MMHG | OXYGEN SATURATION: 97 % | BODY MASS INDEX: 46.13 KG/M2 | HEART RATE: 94 BPM

## 2025-03-20 DIAGNOSIS — D32.9 MENINGIOMA (HCC): ICD-10-CM

## 2025-03-20 DIAGNOSIS — R73.03 PREDIABETES: Primary | ICD-10-CM

## 2025-03-20 DIAGNOSIS — E66.813 CLASS 3 SEVERE OBESITY WITHOUT SERIOUS COMORBIDITY WITH BODY MASS INDEX (BMI) OF 45.0 TO 49.9 IN ADULT, UNSPECIFIED OBESITY TYPE: ICD-10-CM

## 2025-03-20 DIAGNOSIS — R42 DIZZINESS: ICD-10-CM

## 2025-03-20 DIAGNOSIS — E66.01 CLASS 3 SEVERE OBESITY WITHOUT SERIOUS COMORBIDITY WITH BODY MASS INDEX (BMI) OF 45.0 TO 49.9 IN ADULT, UNSPECIFIED OBESITY TYPE: ICD-10-CM

## 2025-03-20 DIAGNOSIS — I10 PRIMARY HYPERTENSION: ICD-10-CM

## 2025-03-20 RX ORDER — MECLIZINE HCL 12.5 MG 12.5 MG/1
12.5 TABLET ORAL 3 TIMES DAILY PRN
Qty: 15 TABLET | Refills: 0 | Status: SHIPPED | OUTPATIENT
Start: 2025-03-20 | End: 2025-03-25

## 2025-03-20 RX ORDER — PHENTERMINE HYDROCHLORIDE 15 MG/1
15 CAPSULE ORAL EVERY MORNING
Qty: 30 CAPSULE | Refills: 0 | Status: SHIPPED | OUTPATIENT
Start: 2025-03-20 | End: 2025-04-19

## 2025-03-20 RX ORDER — AMLODIPINE BESYLATE 5 MG/1
5 TABLET ORAL DAILY
Qty: 30 TABLET | Refills: 11 | Status: SHIPPED | OUTPATIENT
Start: 2025-03-20

## 2025-03-20 NOTE — PROGRESS NOTES
Divine Skinner is a 54 y.o. female thatpresents for Follow-up and Weight Management      History obtained today from Patient.    HPI       HTN  On lisinopril  Started in April  Checks BP at home only if symptomatic  Started on Amlodipine and stopped lisinopril due to cough since last July.  Cough is improved     Snores  Thinks if she loses weight she will stop snoring  Has happened in the past   Declines sleep referral     Weight management  Has tried dirty keto and lost 30 lbs   Tried weight watchers 25 years ago  Lowest she has been in 240s   Has gained 10 lbs in last few months    3-  Down 12 lbs in last month!  Cutting out carbs  Starting weight  282lbs     Meningioma  Right Parietal  Last MRI 2021  Was following neurosurgery  Declined fu MRI last appt       Dizziness  In the last week started, last night had dizziness with headache/lightheaded  Headaches in the last few days  Feels dizzy just sitting there. Not necessarily when she moves too fast  Body aches  Sick last week with fever, sinus congestion  No ear pain   Doesn't last too long  No light sensitivity  No nausea /vomiting   No urinary symptoms    Cervical cancer screening - used to see Dr Anderson. Not had period in over a year, DUE  Colon cancer screening - due referral placed last appt  Breast cancer screening - due, last one 2012 scheduled 3-    I have reviewed the patient's past medical history, past surgical history, allergies,medications, social and family history and I have made updates where appropriate.    Past Medical History:   Diagnosis Date    Gall stones     Hypertension     PONV (postoperative nausea and vomiting)     after nasal surgery       Social History     Tobacco Use    Smoking status: Never    Smokeless tobacco: Never   Vaping Use    Vaping status: Never Used   Substance Use Topics    Alcohol use: Never     Comment: never    Drug use: No       Family History   Problem Relation Age of Onset    Heart Disease Mother

## 2025-03-21 ENCOUNTER — HOSPITAL ENCOUNTER (OUTPATIENT)
Dept: WOMENS IMAGING | Age: 55
Discharge: HOME | End: 2025-03-21
Payer: COMMERCIAL

## 2025-03-21 VITALS — WEIGHT: 270 LBS | HEIGHT: 64 IN | BODY MASS INDEX: 46.1 KG/M2

## 2025-03-21 DIAGNOSIS — Z12.39 ENCOUNTER FOR SCREENING FOR MALIGNANT NEOPLASM OF BREAST, UNSPECIFIED SCREENING MODALITY: ICD-10-CM

## 2025-03-21 PROCEDURE — 77067 SCR MAMMO BI INCL CAD: CPT

## 2025-03-22 ENCOUNTER — RESULTS FOLLOW-UP (OUTPATIENT)
Dept: WOMENS IMAGING | Age: 55
End: 2025-03-22

## 2025-03-24 NOTE — TELEPHONE ENCOUNTER
Pt informed of mammogram results . Pt voiced understanding with no further questions at this time.

## 2025-04-21 DIAGNOSIS — E66.813 CLASS 3 SEVERE OBESITY WITHOUT SERIOUS COMORBIDITY WITH BODY MASS INDEX (BMI) OF 45.0 TO 49.9 IN ADULT, UNSPECIFIED OBESITY TYPE (HCC): ICD-10-CM

## 2025-04-21 RX ORDER — PHENTERMINE HYDROCHLORIDE 15 MG/1
15 CAPSULE ORAL EVERY MORNING
Qty: 30 CAPSULE | Refills: 0 | Status: SHIPPED | OUTPATIENT
Start: 2025-04-21 | End: 2025-05-21

## 2025-04-21 NOTE — TELEPHONE ENCOUNTER
Recent Visits  Date Type Provider Dept   03/20/25 Office Visit Liset Tran APRN - CNP [facility]   02/24/25 Office Visit Liset Trna APRN - CNP [facility]   06/24/24 Office Visit Fredi Negrete DO [facility]   Showing recent visits within past 540 days with a meds authorizing provider and meeting all other requirements  Future Appointments  Date Type Provider Dept   06/23/25 Appointment Liset Tran APRN - CNP [facility]   Showing future appointments within next 150 days with a meds authorizing provider and meeting all other requirements

## 2025-05-21 DIAGNOSIS — E66.813 CLASS 3 SEVERE OBESITY WITHOUT SERIOUS COMORBIDITY WITH BODY MASS INDEX (BMI) OF 45.0 TO 49.9 IN ADULT, UNSPECIFIED OBESITY TYPE (HCC): ICD-10-CM

## 2025-05-21 RX ORDER — PHENTERMINE HYDROCHLORIDE 15 MG/1
15 CAPSULE ORAL EVERY MORNING
Qty: 30 CAPSULE | Refills: 0 | Status: SHIPPED | OUTPATIENT
Start: 2025-05-21 | End: 2025-06-20

## 2025-05-21 NOTE — TELEPHONE ENCOUNTER
Recent Visits  Date Type Provider Dept   03/20/25 Office Visit Liset Tran APRN - CNP [facility]   02/24/25 Office Visit Liset Tran APRN - CNP [facility]   06/24/24 Office Visit Fredi Negrete DO [facility]   Showing recent visits within past 540 days with a meds authorizing provider and meeting all other requirements  Future Appointments  Date Type Provider Dept   06/23/25 Appointment Liset Tran APRN - CNP [facility]   Showing future appointments within next 150 days with a meds authorizing provider and meeting all other requirements

## 2025-06-30 ENCOUNTER — OFFICE VISIT (OUTPATIENT)
Dept: FAMILY MEDICINE CLINIC | Age: 55
End: 2025-06-30

## 2025-06-30 VITALS
OXYGEN SATURATION: 98 % | RESPIRATION RATE: 14 BRPM | DIASTOLIC BLOOD PRESSURE: 74 MMHG | HEIGHT: 64 IN | TEMPERATURE: 97.8 F | HEART RATE: 67 BPM | BODY MASS INDEX: 40.39 KG/M2 | WEIGHT: 236.6 LBS | SYSTOLIC BLOOD PRESSURE: 128 MMHG

## 2025-06-30 DIAGNOSIS — K76.0 HEPATIC STEATOSIS: Primary | ICD-10-CM

## 2025-06-30 DIAGNOSIS — R23.3 EASY BRUISING: ICD-10-CM

## 2025-06-30 DIAGNOSIS — E66.813 CLASS 3 SEVERE OBESITY WITHOUT SERIOUS COMORBIDITY WITH BODY MASS INDEX (BMI) OF 40.0 TO 44.9 IN ADULT, UNSPECIFIED OBESITY TYPE (HCC): ICD-10-CM

## 2025-06-30 DIAGNOSIS — M54.42 ACUTE LEFT-SIDED LOW BACK PAIN WITH LEFT-SIDED SCIATICA: ICD-10-CM

## 2025-06-30 RX ORDER — PHENTERMINE HYDROCHLORIDE 30 MG/1
30 CAPSULE ORAL EVERY MORNING
Qty: 30 CAPSULE | Refills: 0 | Status: SHIPPED | OUTPATIENT
Start: 2025-06-30 | End: 2025-07-30

## 2025-06-30 NOTE — PROGRESS NOTES
Divine Skinner is a 54 y.o. female thatpresents for 3 Month Follow-Up      History obtained today from Patient.    History of Present Illness  The patient presents for weight management, easy bruising, fatty liver, constipation, and blood pressure management.    She reports a general sense of well-being, with a recent weight loss of 34 lbs in 3 months . She has been adhering to a low-carbohydrate diet, avoiding sugar and bread, and has noticed a decrease in the palatability of these foods after prolonged abstinence. Her prescription for phentermine has , and she is considering increasing the dosage from 15 mg to 30 mg. She maintains a high-protein diet, consuming at least 30 g of protein twice daily, and includes Greek yogurt with honey and blueberries in her meals. Increased activity and energy levels have been noted. She does not believe her weight loss medication is affecting her sleep, as she takes it before work. She is also taking prenatal vitamins.    Unexplained bruising on her arms and legs has been observed over the past 2 to 3 weeks, coinciding with the onset of back pain. She recalls an incident where she dropped an object on her foot, resulting in a bruise that was more severe than expected. Additionally, she mentions a bruise that appeared yesterday without any known cause. There is no pain associated with the bruises, except for mild discomfort when pressure is applied.    Occasional soreness in the area of her liver is reported, described as similar to the post-surgical pain following her gallbladder removal. No nausea or vomiting is noted, but she does experience occasional constipation.    She has been off her blood pressure medication for the past 2 days due to forgetfulness and expresses a desire to discontinue it temporarily to assess its impact on her health. An episode of flushing and shortness of breath while asleep was distressing. Advice on electrolyte replacement options is sought,

## 2025-08-04 ENCOUNTER — PATIENT MESSAGE (OUTPATIENT)
Dept: FAMILY MEDICINE CLINIC | Age: 55
End: 2025-08-04

## 2025-08-04 DIAGNOSIS — E66.813 CLASS 3 SEVERE OBESITY WITHOUT SERIOUS COMORBIDITY WITH BODY MASS INDEX (BMI) OF 40.0 TO 44.9 IN ADULT, UNSPECIFIED OBESITY TYPE (HCC): ICD-10-CM

## 2025-08-04 DIAGNOSIS — K76.0 HEPATIC STEATOSIS: ICD-10-CM

## 2025-08-04 RX ORDER — PHENTERMINE HYDROCHLORIDE 30 MG/1
30 CAPSULE ORAL EVERY MORNING
Qty: 30 CAPSULE | Refills: 0 | Status: SHIPPED | OUTPATIENT
Start: 2025-08-04 | End: 2025-09-03

## 2025-09-05 ENCOUNTER — PATIENT MESSAGE (OUTPATIENT)
Dept: FAMILY MEDICINE CLINIC | Age: 55
End: 2025-09-05

## 2025-09-05 DIAGNOSIS — E66.813 CLASS 3 SEVERE OBESITY WITHOUT SERIOUS COMORBIDITY WITH BODY MASS INDEX (BMI) OF 40.0 TO 44.9 IN ADULT, UNSPECIFIED OBESITY TYPE (HCC): ICD-10-CM

## 2025-09-05 DIAGNOSIS — K76.0 HEPATIC STEATOSIS: ICD-10-CM

## 2025-09-05 RX ORDER — PHENTERMINE HYDROCHLORIDE 30 MG/1
30 CAPSULE ORAL EVERY MORNING
Qty: 30 CAPSULE | Refills: 0 | Status: SHIPPED | OUTPATIENT
Start: 2025-09-05 | End: 2025-10-05

## (undated) DEVICE — TROCAR: Brand: KII FIOS FIRST ENTRY

## (undated) DEVICE — GENERAL LAPAROSCOPY-LF: Brand: MEDLINE INDUSTRIES, INC.

## (undated) DEVICE — BANDAGE ADH W1XL3IN NAT FAB WVN FLX DURABLE N ADH PD SEAL

## (undated) DEVICE — PUMP SUC IRR TBNG L10FT W/ HNDPC ASSEMB STRYKEFLOW 2

## (undated) DEVICE — BAG SPEC REM 224ML W4XL6IN DIA10MM 1 HND GYN DISP ENDOPCH

## (undated) DEVICE — TROCAR: Brand: KII® SLEEVE

## (undated) DEVICE — SOLUTION IV 1000ML 0.9% SOD CHL PH 5 INJ USP VIAFLX PLAS

## (undated) DEVICE — UNIVERSAL MONOPOLAR LAPAROSCOPIC CABLE 10FT, 4MM PIN CONNECTOR: Brand: CONMED

## (undated) DEVICE — PENCIL SMK EVAC ALL IN 1 DSGN ENH VISIBILITY IMPROVED AIR

## (undated) DEVICE — TROCAR: Brand: KII SHIELDED BLADED ACCESS SYSTEM

## (undated) DEVICE — APPLIER CLP M L L11.4IN DIA10MM ENDOSCP ROT MULT FOR LIG

## (undated) DEVICE — GLOVE SURG SZ 7.5 L11.73IN FNGR THK9.8MIL STRW LTX POLYMER

## (undated) DEVICE — INSUFFLATION NEEDLE TO ESTABLISH PNEUMOPERITONEUM.: Brand: INSUFFLATION NEEDLE

## (undated) DEVICE — TUBING INSUFFLATION SMK EVAC HI FLO SET PNEUMOCLEAR